# Patient Record
Sex: FEMALE | Race: BLACK OR AFRICAN AMERICAN | NOT HISPANIC OR LATINO | Employment: FULL TIME | URBAN - METROPOLITAN AREA
[De-identification: names, ages, dates, MRNs, and addresses within clinical notes are randomized per-mention and may not be internally consistent; named-entity substitution may affect disease eponyms.]

---

## 2018-06-11 ENCOUNTER — OFFICE VISIT (OUTPATIENT)
Dept: INTERNAL MEDICINE | Facility: CLINIC | Age: 43
End: 2018-06-11
Payer: COMMERCIAL

## 2018-06-11 VITALS
HEART RATE: 80 BPM | DIASTOLIC BLOOD PRESSURE: 78 MMHG | WEIGHT: 246 LBS | TEMPERATURE: 97 F | BODY MASS INDEX: 46.44 KG/M2 | SYSTOLIC BLOOD PRESSURE: 142 MMHG | HEIGHT: 61 IN | OXYGEN SATURATION: 99 %

## 2018-06-11 DIAGNOSIS — R23.3 EASY BRUISING: ICD-10-CM

## 2018-06-11 DIAGNOSIS — Z80.0 FAMILY HISTORY OF COLON CANCER IN MOTHER: ICD-10-CM

## 2018-06-11 DIAGNOSIS — R03.0 ELEVATED BLOOD PRESSURE READING IN OFFICE WITHOUT DIAGNOSIS OF HYPERTENSION: ICD-10-CM

## 2018-06-11 DIAGNOSIS — Z00.01 ENCOUNTER FOR PREVENTATIVE ADULT HEALTH CARE EXAM WITH ABNORMAL FINDINGS: ICD-10-CM

## 2018-06-11 DIAGNOSIS — R51.9 ACUTE NONINTRACTABLE HEADACHE, UNSPECIFIED HEADACHE TYPE: Primary | ICD-10-CM

## 2018-06-11 PROCEDURE — 99202 OFFICE O/P NEW SF 15 MIN: CPT | Mod: ,,, | Performed by: INTERNAL MEDICINE

## 2018-06-11 RX ORDER — IBUPROFEN 200 MG
400 TABLET ORAL EVERY 6 HOURS PRN
COMMUNITY
End: 2019-05-13

## 2018-06-11 RX ORDER — ACETAMINOPHEN 325 MG/1
650 TABLET ORAL EVERY 6 HOURS PRN
COMMUNITY
End: 2018-07-09

## 2018-06-11 NOTE — PROGRESS NOTES
Subjective:       Patient ID: Noris Lo is a 42 y.o. female.    Chief Complaint: Establish Care (new patient establishment); Headache; and Arm Pain (occasional pain in left arm)    Here to establish care with PCP; just recently obtained insurance.  Has been going to [facility] clinic in [place] as needed but hasn't been there in several years.        Headache    This is a new problem. The current episode started 1 to 4 weeks ago. The problem occurs intermittently. The problem has been unchanged. The pain is located in the bilateral (but usually occurs more often on left side) region. The pain radiates to the left shoulder. The quality of the pain is described as pulsating and throbbing. The pain is at a severity of 8/10. Associated symptoms include back pain, blurred vision (sometimes but not usually associated with headaches), coughing, rhinorrhea and scalp tenderness. Pertinent negatives include no abdominal pain, dizziness, fever, hearing loss, nausea, neck pain, numbness, photophobia, seizures, sinus pressure, vomiting or weakness. The symptoms are aggravated by bright light and work. She has tried NSAIDs and acetaminophen (usually wears hair pulled back tight and finds that is she takes it down, it seems to help;  OTC meds usually help also) for the symptoms. (No known h/o HTN but has checked BP at work when she has a headache and it has been elevated; 150s/120s)     Review of Systems   Constitutional: Positive for chills. Negative for fatigue, fever and unexpected weight change.   HENT: Positive for rhinorrhea. Negative for congestion, hearing loss, postnasal drip, sinus pressure, trouble swallowing and voice change.    Eyes: Positive for blurred vision (sometimes but not usually associated with headaches) and visual disturbance. Negative for photophobia.   Respiratory: Positive for cough. Negative for apnea, choking, chest tightness, shortness of breath and wheezing.    Cardiovascular: Negative for chest  pain, palpitations and leg swelling.   Gastrointestinal: Negative for abdominal pain, blood in stool, constipation, diarrhea, nausea, rectal pain and vomiting.   Endocrine: Negative for cold intolerance, heat intolerance, polydipsia and polyuria.   Genitourinary: Negative for decreased urine volume, difficulty urinating, dysuria, frequency, genital sores, hematuria, menstrual problem, pelvic pain, urgency and vaginal bleeding. Vaginal discharge: occasional.   Musculoskeletal: Positive for back pain. Negative for arthralgias, gait problem, joint swelling, myalgias, neck pain and neck stiffness.   Skin: Negative for color change, rash and wound.   Allergic/Immunologic: Negative for environmental allergies and food allergies.   Neurological: Positive for headaches. Negative for dizziness, tremors, seizures, syncope, facial asymmetry, speech difficulty, weakness, light-headedness and numbness.   Hematological: Negative for adenopathy. Bruises/bleeds easily.   Psychiatric/Behavioral: Negative for confusion, hallucinations, sleep disturbance and suicidal ideas. The patient is nervous/anxious.      History reviewed. No pertinent past medical history.   Past Surgical History:   Procedure Laterality Date    HERNIA REPAIR      umbilical x2       Family History   Problem Relation Age of Onset    Colon cancer Mother 48    Cirrhosis Father     Liver cancer Father        Social History     Social History    Marital status:      Spouse name: N/A    Number of children: N/A    Years of education: N/A     Occupational History    hoursekeeping      Social History Main Topics    Smoking status: Never Smoker    Smokeless tobacco: Never Used    Alcohol use Yes      Comment: occasional    Drug use: No    Sexual activity: Yes     Partners: Male     Birth control/ protection: None      Comment:      Other Topics Concern    None     Social History Narrative    Live with        Current Outpatient  "Prescriptions   Medication Sig Dispense Refill    acetaminophen (TYLENOL) 325 MG tablet Take 650 mg by mouth every 6 (six) hours as needed for Pain.      ibuprofen (ADVIL,MOTRIN) 200 MG tablet Take 400 mg by mouth every 6 (six) hours as needed for Pain.       No current facility-administered medications for this visit.        Review of patient's allergies indicates:  No Known Allergies  Objective:    HPI     Establish Care    Additional comments: new patient establishment           Arm Pain    Additional comments: occasional pain in left arm       Last edited by Madelin Adams MA on 6/11/2018  2:25 PM. (History)      Blood pressure (!) 142/78, pulse 80, temperature 96.7 °F (35.9 °C), temperature source Temporal, height 5' 1.25" (1.556 m), weight 111.6 kg (246 lb), last menstrual period 06/07/2018, SpO2 99 %. Body mass index is 46.1 kg/m².   Physical Exam   Constitutional: She appears well-developed. No distress.   Morbidly obese     HENT:   Head: Normocephalic and atraumatic.   Right Ear: Hearing, tympanic membrane, external ear and ear canal normal.   Left Ear: Hearing, tympanic membrane, external ear and ear canal normal.   Nose: Nose normal.   Mouth/Throat: Uvula is midline and oropharynx is clear and moist.   Eyes: Conjunctivae, EOM and lids are normal. Pupils are equal, round, and reactive to light. Right eye exhibits no discharge. Left eye exhibits no discharge. Right conjunctiva is not injected. Right conjunctiva has no hemorrhage. Left conjunctiva is not injected. Left conjunctiva has no hemorrhage. No scleral icterus.   Neck: Carotid bruit is not present. No thyromegaly present.   Cardiovascular: Normal rate, regular rhythm and normal heart sounds.  Exam reveals no gallop and no friction rub.    No murmur heard.  Pulses:       Dorsalis pedis pulses are 2+ on the right side, and 2+ on the left side.   Pulmonary/Chest: Effort normal and breath sounds normal. No respiratory distress. She has no decreased " breath sounds. She has no wheezes. She has no rhonchi. She has no rales.   Abdominal: Soft. Bowel sounds are normal. She exhibits no distension, no abdominal bruit, no pulsatile midline mass and no mass. There is no hepatosplenomegaly. There is no tenderness. There is no rebound and no guarding. No hernia.   Musculoskeletal: She exhibits no edema.   Lymphadenopathy:     She has no cervical adenopathy.   Neurological: She is alert. She has normal reflexes. She displays no tremor. No cranial nerve deficit.   Skin: Skin is warm and dry.   Psychiatric: She has a normal mood and affect. Her speech is normal and behavior is normal.   Nursing note and vitals reviewed.          Assessment:       1. Acute nonintractable headache, unspecified headache type    2. Elevated blood pressure reading in office without diagnosis of hypertension    3. Family history of colon cancer in mother    4. Class 3 obesity with body mass index (BMI) of 45.0 to 49.9 in adult, unspecified obesity type, unspecified whether serious comorbidity present    5. Encounter for preventative adult health care exam with abnormal findings    6. Easy bruising        Plan:       Noris was seen today for establish care, headache and arm pain.    Diagnoses and all orders for this visit:    Acute nonintractable headache, unspecified headache type  Comments:  I suspect it is r/t pulling hair too tightly but allergies or BP also considerations.  Try leaving hair down for a week and see what happens      Elevated blood pressure reading in office without diagnosis of hypertension  Comments:  Low salt diet.  Discussed proper cuff size      Family history of colon cancer in mother  -     Ambulatory referral to Gastroenterology    Class 3 obesity with body mass index (BMI) of 45.0 to 49.9 in adult, unspecified obesity type, unspecified whether serious comorbidity present  Comments:  Discussed diet and exercise    Orders:  -     TSH; Future  -     TSH    Encounter  for preventative adult health care exam with abnormal findings  Comments:  Will order labs now; PAP and breast exam next visit  Orders:  -     Comprehensive metabolic panel; Future  -     Lipid panel; Future  -     Comprehensive metabolic panel  -     Lipid panel    Easy bruising  -     CBC auto differential; Future  -     CBC auto differential

## 2018-06-15 ENCOUNTER — TELEPHONE (OUTPATIENT)
Dept: INTERNAL MEDICINE | Facility: CLINIC | Age: 43
End: 2018-06-15

## 2018-06-15 LAB
ALBUMIN SERPL-MCNC: 3.9 G/DL (ref 3.5–5.5)
ALBUMIN/GLOB SERPL: 1.4 {RATIO} (ref 1.2–2.2)
ALP SERPL-CCNC: 53 IU/L (ref 39–117)
ALT SERPL-CCNC: 12 IU/L (ref 0–32)
AST SERPL-CCNC: 19 IU/L (ref 0–40)
BASOPHILS # BLD AUTO: 0 X10E3/UL (ref 0–0.2)
BASOPHILS NFR BLD AUTO: 0 %
BILIRUB SERPL-MCNC: 0.2 MG/DL (ref 0–1.2)
BUN SERPL-MCNC: 12 MG/DL (ref 6–24)
BUN/CREAT SERPL: 17 (ref 9–23)
CALCIUM SERPL-MCNC: 9.3 MG/DL (ref 8.7–10.2)
CHLORIDE SERPL-SCNC: 102 MMOL/L (ref 96–106)
CHOLEST SERPL-MCNC: 175 MG/DL (ref 100–199)
CO2 SERPL-SCNC: 21 MMOL/L (ref 20–29)
CREAT SERPL-MCNC: 0.72 MG/DL (ref 0.57–1)
EOSINOPHIL # BLD AUTO: 0.1 X10E3/UL (ref 0–0.4)
EOSINOPHIL NFR BLD AUTO: 2 %
ERYTHROCYTE [DISTWIDTH] IN BLOOD BY AUTOMATED COUNT: 15 % (ref 12.3–15.4)
GFR SERPLBLD CREATININE-BSD FMLA CKD-EPI: 104 ML/MIN/1.73
GFR SERPLBLD CREATININE-BSD FMLA CKD-EPI: 119 ML/MIN/1.73
GLOBULIN SER CALC-MCNC: 2.8 G/DL (ref 1.5–4.5)
GLUCOSE SERPL-MCNC: 95 MG/DL (ref 65–99)
HCT VFR BLD AUTO: 35.7 % (ref 34–46.6)
HDLC SERPL-MCNC: 55 MG/DL
HGB BLD-MCNC: 11.2 G/DL (ref 11.1–15.9)
IMM GRANULOCYTES # BLD: 0 X10E3/UL (ref 0–0.1)
IMM GRANULOCYTES NFR BLD: 0 %
LDLC SERPL CALC-MCNC: 107 MG/DL (ref 0–99)
LYMPHOCYTES # BLD AUTO: 2.3 X10E3/UL (ref 0.7–3.1)
LYMPHOCYTES NFR BLD AUTO: 30 %
MCH RBC QN AUTO: 28 PG (ref 26.6–33)
MCHC RBC AUTO-ENTMCNC: 31.4 G/DL (ref 31.5–35.7)
MCV RBC AUTO: 89 FL (ref 79–97)
MONOCYTES # BLD AUTO: 0.5 X10E3/UL (ref 0.1–0.9)
MONOCYTES NFR BLD AUTO: 7 %
NEUTROPHILS # BLD AUTO: 4.5 X10E3/UL (ref 1.4–7)
NEUTROPHILS NFR BLD AUTO: 61 %
PLATELET # BLD AUTO: 291 X10E3/UL (ref 150–379)
POTASSIUM SERPL-SCNC: 4.3 MMOL/L (ref 3.5–5.2)
PROT SERPL-MCNC: 6.7 G/DL (ref 6–8.5)
RBC # BLD AUTO: 4 X10E6/UL (ref 3.77–5.28)
SODIUM SERPL-SCNC: 139 MMOL/L (ref 134–144)
TRIGL SERPL-MCNC: 63 MG/DL (ref 0–149)
TSH SERPL DL<=0.005 MIU/L-ACNC: 2.4 UIU/ML (ref 0.45–4.5)
VLDLC SERPL CALC-MCNC: 13 MG/DL (ref 5–40)
WBC # BLD AUTO: 7.5 X10E3/UL (ref 3.4–10.8)

## 2018-06-15 NOTE — TELEPHONE ENCOUNTER
----- Message from Ishmael Gil Jr., MD sent at 6/15/2018  8:29 AM CDT -----  I have reviewed your results.  They demonstrate no abnormal findings.  If you have any additional concerns regarding these tests, please contact me at your convenience.

## 2018-07-09 ENCOUNTER — OFFICE VISIT (OUTPATIENT)
Dept: INTERNAL MEDICINE | Facility: CLINIC | Age: 43
End: 2018-07-09
Payer: COMMERCIAL

## 2018-07-09 VITALS
TEMPERATURE: 98 F | SYSTOLIC BLOOD PRESSURE: 124 MMHG | HEIGHT: 61 IN | BODY MASS INDEX: 46.03 KG/M2 | OXYGEN SATURATION: 98 % | DIASTOLIC BLOOD PRESSURE: 76 MMHG | WEIGHT: 243.81 LBS | HEART RATE: 97 BPM

## 2018-07-09 DIAGNOSIS — Z01.419 WELL WOMAN EXAM WITH ROUTINE GYNECOLOGICAL EXAM: Primary | ICD-10-CM

## 2018-07-09 DIAGNOSIS — Z12.39 ENCOUNTER FOR SCREENING BREAST EXAMINATION: ICD-10-CM

## 2018-07-09 DIAGNOSIS — R03.0 ELEVATED BLOOD PRESSURE READING IN OFFICE WITHOUT DIAGNOSIS OF HYPERTENSION: ICD-10-CM

## 2018-07-09 PROCEDURE — 99396 PREV VISIT EST AGE 40-64: CPT | Mod: ,,, | Performed by: INTERNAL MEDICINE

## 2018-07-09 NOTE — PROGRESS NOTES
Subjective:       Patient ID: Noris Lo is a 42 y.o. female.    Chief Complaint: Follow-up (followup labs)    Here for follow up and annual well visit.  She hasn't had a headache since she started leaving her hair down.  Also has been monitoring BP and it has been running 120s/70s at home.        Review of Systems   Constitutional: Positive for fatigue. Negative for chills, fever and unexpected weight change.   HENT: Negative for congestion, hearing loss, postnasal drip, rhinorrhea, trouble swallowing and voice change.    Eyes: Negative for photophobia and visual disturbance.   Respiratory: Negative for apnea, cough, choking, chest tightness, shortness of breath and wheezing.    Cardiovascular: Negative for chest pain, palpitations and leg swelling.   Gastrointestinal: Negative for abdominal pain, blood in stool, constipation, diarrhea, nausea, rectal pain and vomiting.   Endocrine: Negative for cold intolerance, heat intolerance, polydipsia and polyuria.   Genitourinary: Negative for decreased urine volume, difficulty urinating, dysuria, frequency, genital sores, hematuria, menstrual problem, pelvic pain, urgency, vaginal bleeding and vaginal discharge.   Musculoskeletal: Positive for arthralgias (left shoulder). Negative for back pain, gait problem, joint swelling, myalgias, neck pain and neck stiffness.   Skin: Negative for color change, rash and wound.   Allergic/Immunologic: Negative for environmental allergies and food allergies.   Neurological: Negative for dizziness, tremors, seizures, syncope, facial asymmetry, speech difficulty, weakness, light-headedness, numbness and headaches.   Hematological: Negative for adenopathy. Does not bruise/bleed easily.   Psychiatric/Behavioral: Negative for confusion, hallucinations, sleep disturbance and suicidal ideas. The patient is not nervous/anxious.      History reviewed. No pertinent past medical history.   Past Surgical History:   Procedure Laterality Date  "   HERNIA REPAIR      umbilical x2       Family History   Problem Relation Age of Onset    Colon cancer Mother 48    Cirrhosis Father     Liver cancer Father        Social History     Social History    Marital status:      Spouse name: N/A    Number of children: N/A    Years of education: N/A     Occupational History    hoursekeeping      Social History Main Topics    Smoking status: Never Smoker    Smokeless tobacco: Never Used    Alcohol use Yes      Comment: occasional    Drug use: No    Sexual activity: Yes     Partners: Male     Birth control/ protection: None      Comment:      Other Topics Concern    None     Social History Narrative    Live with        Current Outpatient Prescriptions   Medication Sig Dispense Refill    ibuprofen (ADVIL,MOTRIN) 200 MG tablet Take 400 mg by mouth every 6 (six) hours as needed for Pain.       No current facility-administered medications for this visit.        Review of patient's allergies indicates:  No Known Allergies  Objective:    HPI     Follow-up    Additional comments: followup labs       Last edited by Madelin Adams MA on 7/9/2018  4:41 PM. (History)      Blood pressure 124/76, pulse 97, temperature 97.6 °F (36.4 °C), temperature source Temporal, height 5' 1.25" (1.556 m), weight 110.6 kg (243 lb 12.8 oz), last menstrual period 06/30/2018, SpO2 98 %. Body mass index is 45.69 kg/m².   Physical Exam   Constitutional: She appears well-developed. No distress.   Morbidly obese     HENT:   Head: Normocephalic and atraumatic.   Right Ear: Hearing, tympanic membrane, external ear and ear canal normal.   Left Ear: Hearing, tympanic membrane, external ear and ear canal normal.   Nose: Nose normal.   Mouth/Throat: Uvula is midline and oropharynx is clear and moist.   Eyes: Conjunctivae, EOM and lids are normal. Pupils are equal, round, and reactive to light. Right eye exhibits no discharge. Left eye exhibits no discharge. Right conjunctiva is " not injected. Right conjunctiva has no hemorrhage. Left conjunctiva is not injected. Left conjunctiva has no hemorrhage. No scleral icterus.   Neck: Carotid bruit is not present. No thyromegaly present.   Cardiovascular: Normal rate, regular rhythm and normal heart sounds.  Exam reveals no gallop and no friction rub.    No murmur heard.  Pulses:       Dorsalis pedis pulses are 2+ on the right side, and 2+ on the left side.   Pulmonary/Chest: Effort normal and breath sounds normal. No respiratory distress. She has no decreased breath sounds. She has no wheezes. She has no rhonchi. She has no rales. Right breast exhibits no inverted nipple, no mass, no nipple discharge, no skin change and no tenderness. Left breast exhibits no inverted nipple, no mass, no nipple discharge, no skin change and no tenderness. Breasts are symmetrical (pendulous).   Abdominal: Soft. Bowel sounds are normal. She exhibits no distension, no abdominal bruit, no pulsatile midline mass and no mass. There is no hepatosplenomegaly. There is no tenderness. There is no rebound and no guarding. No hernia.   Genitourinary: Vagina normal. Pelvic exam was performed with patient supine. No labial fusion. There is no rash, tenderness, lesion or injury on the right labia. There is no rash, tenderness, lesion or injury on the left labia. Uterus is not tender. Cervix exhibits no motion tenderness, no discharge and no friability. Right adnexum displays no mass, no tenderness and no fullness. Left adnexum displays no mass, no tenderness and no fullness.   Genitourinary Comments: cystocoele     Musculoskeletal: She exhibits no edema.   Lymphadenopathy:     She has no cervical adenopathy.   Neurological: She is alert. She has normal reflexes. She displays no tremor. No cranial nerve deficit.   Skin: Skin is warm and dry.   Psychiatric: She has a normal mood and affect. Her speech is normal and behavior is normal.   Nursing note and vitals reviewed.      Office  Visit on 06/11/2018   Component Date Value Ref Range Status    Glucose 06/14/2018 95  65 - 99 mg/dL Final    BUN, Bld 06/14/2018 12  6 - 24 mg/dL Final    Creatinine 06/14/2018 0.72  0.57 - 1.00 mg/dL Final    eGFR if non African American 06/14/2018 104  >59 mL/min/1.73 Final    eGFR if  06/14/2018 119  >59 mL/min/1.73 Final    BUN/Creatinine Ratio 06/14/2018 17  9 - 23 Final    Sodium 06/14/2018 139  134 - 144 mmol/L Final    Potassium 06/14/2018 4.3  3.5 - 5.2 mmol/L Final    Chloride 06/14/2018 102  96 - 106 mmol/L Final    CO2 06/14/2018 21  20 - 29 mmol/L Final                  **Please note reference interval change**    Calcium 06/14/2018 9.3  8.7 - 10.2 mg/dL Final    Total Protein 06/14/2018 6.7  6.0 - 8.5 g/dL Final    Albumin 06/14/2018 3.9  3.5 - 5.5 g/dL Final    Globulin, Total 06/14/2018 2.8  1.5 - 4.5 g/dL Final    Albumin/Globulin Ratio 06/14/2018 1.4  1.2 - 2.2 Final    Total Bilirubin 06/14/2018 0.2  0.0 - 1.2 mg/dL Final    Alkaline Phosphatase 06/14/2018 53  39 - 117 IU/L Final    AST 06/14/2018 19  0 - 40 IU/L Final    ALT 06/14/2018 12  0 - 32 IU/L Final    Cholesterol 06/14/2018 175  100 - 199 mg/dL Final    Triglycerides 06/14/2018 63  0 - 149 mg/dL Final    HDL 06/14/2018 55  >39 mg/dL Final    VLDL Cholesterol Lauro 06/14/2018 13  5 - 40 mg/dL Final    LDL Calculated 06/14/2018 107* 0 - 99 mg/dL Final    TSH 06/14/2018 2.400  0.450 - 4.500 uIU/mL Final    WBC 06/14/2018 7.5  3.4 - 10.8 x10E3/uL Final    RBC 06/14/2018 4.00  3.77 - 5.28 x10E6/uL Final    Hemoglobin 06/14/2018 11.2  11.1 - 15.9 g/dL Final    Hematocrit 06/14/2018 35.7  34.0 - 46.6 % Final    MCV 06/14/2018 89  79 - 97 fL Final    MCH 06/14/2018 28.0  26.6 - 33.0 pg Final    MCHC 06/14/2018 31.4* 31.5 - 35.7 g/dL Final    RDW 06/14/2018 15.0  12.3 - 15.4 % Final    Platelets 06/14/2018 291  150 - 379 x10E3/uL Final    Neutrophils 06/14/2018 61  Not Estab. % Final    Lymph%  06/14/2018 30  Not Estab. % Final    Mono% 06/14/2018 7  Not Estab. % Final    Eosinophil% 06/14/2018 2  Not Estab. % Final    Basophil% 06/14/2018 0  Not Estab. % Final    Neutrophils Absolute 06/14/2018 4.5  1.4 - 7.0 x10E3/uL Final    Lymph # 06/14/2018 2.3  0.7 - 3.1 x10E3/uL Final    Mono # 06/14/2018 0.5  0.1 - 0.9 x10E3/uL Final    Eos # 06/14/2018 0.1  0.0 - 0.4 x10E3/uL Final    Baso # 06/14/2018 0.0  0.0 - 0.2 x10E3/uL Final    Immature Granulocytes 06/14/2018 0  Not Estab. % Final    Immature Grans (Abs) 06/14/2018 0.0  0.0 - 0.1 x10E3/uL Final   ]  Assessment:       1. Well woman exam with routine gynecological exam    2. Encounter for screening breast examination    3. Elevated blood pressure reading in office without diagnosis of hypertension        Plan:       Noris was seen today for follow-up.    Diagnoses and all orders for this visit:    Well woman exam with routine gynecological exam  Comments:  Discussed kegal exercises.    Encounter for screening breast examination    Elevated blood pressure reading in office without diagnosis of hypertension  Comments:  BP back to nornal.  It was likely elevated d/t headache.  Continue to monitor and work on weight loss.

## 2018-07-18 LAB
CYTOLOGIST CVX/VAG CYTO: NORMAL
CYTOLOGY CVX/VAG DOC THIN PREP: NORMAL
DX ICD CODE: NORMAL
HPV I/H RISK 1 DNA CVX QL PROBE+SIG AMP: NEGATIVE
Lab: NORMAL
OTHER STN SPEC: NORMAL
PATH REPORT.FINAL DX SPEC: NORMAL
STAT OF ADQ CVX/VAG CYTO-IMP: NORMAL

## 2018-12-10 ENCOUNTER — OFFICE VISIT (OUTPATIENT)
Dept: FAMILY MEDICINE | Facility: CLINIC | Age: 43
End: 2018-12-10
Payer: COMMERCIAL

## 2018-12-10 VITALS
TEMPERATURE: 97 F | DIASTOLIC BLOOD PRESSURE: 76 MMHG | HEIGHT: 61 IN | HEART RATE: 101 BPM | SYSTOLIC BLOOD PRESSURE: 130 MMHG | BODY MASS INDEX: 46.44 KG/M2 | WEIGHT: 246 LBS | OXYGEN SATURATION: 97 %

## 2018-12-10 DIAGNOSIS — R03.0 ELEVATED BLOOD PRESSURE READING IN OFFICE WITHOUT DIAGNOSIS OF HYPERTENSION: Primary | ICD-10-CM

## 2018-12-10 PROCEDURE — 99213 OFFICE O/P EST LOW 20 MIN: CPT | Mod: ,,, | Performed by: INTERNAL MEDICINE

## 2018-12-10 NOTE — PATIENT INSTRUCTIONS
Your overall health would benefit from weight loss.  There are many different systems out there to achieve weight loss but they are often hard to stick with over long periods of time.  I try to keep it simple.  Try and do some form of exercise, even if it is just walking, 30 minutes per day.  Try to limit your sugar intake by cutting back on soft drinks, sweet tea, desserts, snacks, etc.  Drink more water, especially while preparing a meal and with a meal.  This occupies space in your stomach and makes you feel full faster so you eat less.  Put less food on the plate; most of us eat double or triple the recommended serving sizes.  Even if you are eating the right things, you could still be eating too much.  Don't go back for seconds immediately.  There is a delay between your stomach and brain of about 20-30 minutes.  You may still feel hungry even though you really have had enough to eat.  Let your brain catch up to your stomach.  At the end of the day, it doesn't matter which system you use, it's about calories in and calories out.    Low-Salt Diet  This diet removes foods that are high in salt. It also limits the amount of salt you use when cooking. It is most often used for people with high blood pressure, edema (fluid retention), and kidney, liver, or heart disease.  Table salt contains the mineral sodium. Your body needs sodium to work normally. But too much sodium can make your health problems worse. Your healthcare provider is recommending a low-salt (also called low-sodium) diet for you. Your total daily allowance of salt is 1,500 to 2,300 milligrams (mg). It is less than 1 teaspoon of table salt. This means you can have only about 500 to 700 mg of sodium at each meal. People with certain health problems should limit salt intake to the lower end of the recommended range.    When you cook, dont add much salt. If you can cook without using salt, even better. Dont add salt to your food at the table.  When  shopping, read food labels. Salt is often called sodium on the label. Choose foods that are salt-free, low salt, or very low salt. Note that foods with reduced salt may not lower your salt intake enough.    Beans, potatoes, and pasta  Ok: Dry beans, split peas, lentils, potatoes, rice, macaroni, pasta, spaghetti without added salt  Avoid: Potato chips, tortilla chips, and similar products  Breads and cereals  Ok: Low-sodium breads, rolls, cereals, and cakes; low-salt crackers, matzo crackers  Avoid: Salted crackers, pretzels, popcorn, Lithuanian toast, pancakes, muffins  Dairy  Ok: Milk, chocolate milk, hot chocolate mix, low-salt cheeses, and yogurt  Avoid: Processed cheese and cheese spreads; Roquefort, Camembert, and cottage cheese; buttermilk, instant breakfast drink  Desserts  Ok: Ice cream, frozen yogurt, juice bars, gelatin, cookies and pies, sugar, honey, jelly, hard candy  Avoid: Most pies, cakes and cookies prepared or processed with salt; instant pudding  Drinks  Ok: Tea, coffee, fizzy (carbonated) drinks, juices  Avoid: Flavored coffees, electrolyte replacement drinks, sports drinks  Meats  Ok: All fresh meat, fish, poultry, low-salt tuna, eggs, egg substitute  Avoid: Smoked, pickled, brine-cured, or salted meats and fish. This includes hester, chipped beef, corned beef, hot dogs, deli meats, ham, kosher meats, salt pork, sausage, canned tuna, salted codfish, smoked salmon, herring, sardines, or anchovies.  Seasonings and spices  Ok: Most seasonings are okay. Good substitutes for salt include: fresh herb blends, hot sauce, lemon, garlic, mora, vinegar, dry mustard, parsley, cilantro, horseradish, tomato paste, regular margarine, mayonnaise, unsalted butter, cream cheese, vegetable oil, cream, low-salt salad dressing and gravy.  Avoid: Regular ketchup, relishes, pickles, soy sauce, teriyaki sauce, Worcestershire sauce, BBQ sauce, tartar sauce, meat tenderizer, chili sauce, regular gravy, regular salad  dressing, salted butter  Soups  Ok: Low-salt soups and broths made with allowed foods  Avoid: Bouillon cubes, soups with smoked or salted meats, regular soup and broth  Vegetables  Ok: Most vegetables are okay; also low-salt tomato and vegetable juices  Avoid: Sauerkraut and other brine-soaked vegetables; pickles and other pickled vegetables; tomato juice, olives  Date Last Reviewed: 8/1/2016 © 2000-2017 [employer], [employer]. [address] [place] [address]. All rights reserved. This information is not intended as a substitute for professional medical care. Always follow your healthcare professional's instructions.

## 2018-12-10 NOTE — PROGRESS NOTES
Subjective:       Patient ID: Noris Lo is a 42 y.o. female.    Chief Complaint: Follow-up (general checkup)    Here for routine follow up to monitor BP which was elevated at a prior visit.  Has continued to monitor it at home and it's been mostly 130s/80s; sometimes higher when stressed.  No further issues with headaches since we discussed not putting hair in tight alisha.        Review of Systems   Constitutional: Positive for fatigue. Negative for chills, fever and unexpected weight change.   HENT: Negative for congestion, hearing loss, postnasal drip, rhinorrhea, trouble swallowing and voice change.    Eyes: Negative for photophobia and visual disturbance.   Respiratory: Negative for apnea, cough, choking, chest tightness, shortness of breath and wheezing.    Cardiovascular: Negative for chest pain, palpitations and leg swelling.   Gastrointestinal: Negative for abdominal pain, blood in stool, constipation, diarrhea, nausea, rectal pain and vomiting.   Endocrine: Negative for cold intolerance, heat intolerance, polydipsia and polyuria.   Genitourinary: Negative for decreased urine volume, difficulty urinating, dysuria, frequency, genital sores, hematuria, menstrual problem, pelvic pain, urgency, vaginal bleeding and vaginal discharge.   Musculoskeletal: Positive for arthralgias (left shoulder). Negative for back pain, gait problem, joint swelling, myalgias, neck pain and neck stiffness.   Skin: Negative for color change, rash and wound.   Allergic/Immunologic: Negative for environmental allergies and food allergies.   Neurological: Negative for dizziness, tremors, seizures, syncope, facial asymmetry, speech difficulty, weakness, light-headedness, numbness and headaches.   Hematological: Negative for adenopathy. Does not bruise/bleed easily.   Psychiatric/Behavioral: Negative for confusion, hallucinations, sleep disturbance and suicidal ideas. The patient is not nervous/anxious.      History reviewed.  "No pertinent past medical history.   Past Surgical History:   Procedure Laterality Date    HERNIA REPAIR      umbilical x2       Family History   Problem Relation Age of Onset    Colon cancer Mother 48    Cirrhosis Father     Liver cancer Father        Social History     Socioeconomic History    Marital status:      Spouse name: None    Number of children: None    Years of education: None    Highest education level: None   Social Needs    Financial resource strain: None    Food insecurity - worry: None    Food insecurity - inability: None    Transportation needs - medical: None    Transportation needs - non-medical: None   Occupational History    Occupation: hoursekeeping   Tobacco Use    Smoking status: Never Smoker    Smokeless tobacco: Never Used   Substance and Sexual Activity    Alcohol use: Yes     Comment: occasional    Drug use: No    Sexual activity: Yes     Partners: Male     Birth control/protection: None     Comment:    Other Topics Concern    None   Social History Narrative    Live with        Current Outpatient Medications   Medication Sig Dispense Refill    ibuprofen (ADVIL,MOTRIN) 200 MG tablet Take 400 mg by mouth every 6 (six) hours as needed for Pain.       No current facility-administered medications for this visit.        Review of patient's allergies indicates:  No Known Allergies  Objective:    HPI     Follow-up      Additional comments: general checkup          Last edited by Madelin Adams MA on 12/10/2018  3:56 PM. (History)      Blood pressure 130/76, pulse 101, temperature 97.3 °F (36.3 °C), temperature source Temporal, height 5' 1.25" (1.556 m), weight 111.6 kg (246 lb), last menstrual period 11/12/2018, SpO2 97 %. Body mass index is 46.1 kg/m².   Physical Exam   Constitutional: She appears well-developed. No distress.   Obese     HENT:   Nose: Nose normal.   Mouth/Throat: Oropharynx is clear and moist.   Eyes: Conjunctivae are normal. Right eye " exhibits no discharge. Left eye exhibits no discharge. No scleral icterus.   Neck: Carotid bruit is not present.   Cardiovascular: Normal rate, regular rhythm and normal heart sounds.   No murmur heard.  Pulmonary/Chest: Effort normal and breath sounds normal. No respiratory distress. She has no decreased breath sounds. She has no wheezes. She has no rhonchi. She has no rales.   Abdominal: Soft. She exhibits no distension. There is no tenderness. There is no rebound and no guarding.   Musculoskeletal: She exhibits no edema.   Neurological: She is alert. She displays no tremor.   Skin: Skin is warm and dry.   Psychiatric: She has a normal mood and affect. Her speech is normal.   Nursing note and vitals reviewed.        Orders Only on 07/09/2018   Component Date Value Ref Range Status    DIAGNOSIS: 07/09/2018 Comment   Final    NEGATIVE FOR INTRAEPITHELIAL LESION AND MALIGNANCY.    Specimen adequacy: 07/09/2018 Comment   Final    Satisfactory for evaluation. No endocervical component is identified.    Clinician Provided ICD10 07/09/2018 Comment   Final    [id number]    Performed by: 07/09/2018 Comment   Final    Kate Walters, Cytotechnologist (ASCP)    . 07/09/2018 .   Final    Note: 07/09/2018 Comment   Final    Comment: The Pap smear is a screening test designed to aid in the detection of  premalignant and malignant conditions of the uterine cervix.  It is not a  diagnostic procedure and should not be used as the sole means of detecting  cervical cancer.  Both false-positive and false-negative reports do occur.      Test Methodology: 07/09/2018 Comment   Final    Comment: This liquid based SurePath(R) pap test was screened with the  assistance of an image guided system.      HPV, high-risk 07/09/2018 Negative  Negative Final    Comment: This high-risk HPV test detects thirteen high-risk types  (16/18/31/33/35/39/45/51/52/56/58/59/68) without differentiation.     Office Visit on 06/11/2018   Component Date Value  Ref Range Status    Glucose 06/14/2018 95  65 - 99 mg/dL Final    BUN, Bld 06/14/2018 12  6 - 24 mg/dL Final    Creatinine 06/14/2018 0.72  0.57 - 1.00 mg/dL Final    eGFR if non African American 06/14/2018 104  >59 mL/min/1.73 Final    eGFR if  06/14/2018 119  >59 mL/min/1.73 Final    BUN/Creatinine Ratio 06/14/2018 17  9 - 23 Final    Sodium 06/14/2018 139  134 - 144 mmol/L Final    Potassium 06/14/2018 4.3  3.5 - 5.2 mmol/L Final    Chloride 06/14/2018 102  96 - 106 mmol/L Final    CO2 06/14/2018 21  20 - 29 mmol/L Final                  **Please note reference interval change**    Calcium 06/14/2018 9.3  8.7 - 10.2 mg/dL Final    Total Protein 06/14/2018 6.7  6.0 - 8.5 g/dL Final    Albumin 06/14/2018 3.9  3.5 - 5.5 g/dL Final    Globulin, Total 06/14/2018 2.8  1.5 - 4.5 g/dL Final    Albumin/Globulin Ratio 06/14/2018 1.4  1.2 - 2.2 Final    Total Bilirubin 06/14/2018 0.2  0.0 - 1.2 mg/dL Final    Alkaline Phosphatase 06/14/2018 53  39 - 117 IU/L Final    AST 06/14/2018 19  0 - 40 IU/L Final    ALT 06/14/2018 12  0 - 32 IU/L Final    Cholesterol 06/14/2018 175  100 - 199 mg/dL Final    Triglycerides 06/14/2018 63  0 - 149 mg/dL Final    HDL 06/14/2018 55  >39 mg/dL Final    VLDL Cholesterol Lauro 06/14/2018 13  5 - 40 mg/dL Final    LDL Calculated 06/14/2018 107* 0 - 99 mg/dL Final    TSH 06/14/2018 2.400  0.450 - 4.500 uIU/mL Final    WBC 06/14/2018 7.5  3.4 - 10.8 x10E3/uL Final    RBC 06/14/2018 4.00  3.77 - 5.28 x10E6/uL Final    Hemoglobin 06/14/2018 11.2  11.1 - 15.9 g/dL Final    Hematocrit 06/14/2018 35.7  34.0 - 46.6 % Final    MCV 06/14/2018 89  79 - 97 fL Final    MCH 06/14/2018 28.0  26.6 - 33.0 pg Final    MCHC 06/14/2018 31.4* 31.5 - 35.7 g/dL Final    RDW 06/14/2018 15.0  12.3 - 15.4 % Final    Platelets 06/14/2018 291  150 - 379 x10E3/uL Final    Neutrophils 06/14/2018 61  Not Estab. % Final    Lymph% 06/14/2018 30  Not Estab. % Final    Mono%  06/14/2018 7  Not Estab. % Final    Eosinophil% 06/14/2018 2  Not Estab. % Final    Basophil% 06/14/2018 0  Not Estab. % Final    Neutrophils Absolute 06/14/2018 4.5  1.4 - 7.0 x10E3/uL Final    Lymph # 06/14/2018 2.3  0.7 - 3.1 x10E3/uL Final    Mono # 06/14/2018 0.5  0.1 - 0.9 x10E3/uL Final    Eos # 06/14/2018 0.1  0.0 - 0.4 x10E3/uL Final    Baso # 06/14/2018 0.0  0.0 - 0.2 x10E3/uL Final    Immature Granulocytes 06/14/2018 0  Not Estab. % Final    Immature Grans (Abs) 06/14/2018 0.0  0.0 - 0.1 x10E3/uL Final   ]  Assessment:       1. Elevated blood pressure reading in office without diagnosis of hypertension        Plan:       Noris was seen today for follow-up.    Diagnoses and all orders for this visit:    Elevated blood pressure reading in office without diagnosis of hypertension  Comments:  Reinforced diet, exercise, weight loss.  Continue to monitor.

## 2018-12-13 ENCOUNTER — TELEPHONE (OUTPATIENT)
Dept: FAMILY MEDICINE | Facility: CLINIC | Age: 43
End: 2018-12-13

## 2018-12-13 RX ORDER — ONDANSETRON HYDROCHLORIDE 8 MG/1
8 TABLET, FILM COATED ORAL EVERY 8 HOURS PRN
Qty: 15 TABLET | Refills: 1 | Status: SHIPPED | OUTPATIENT
Start: 2018-12-13 | End: 2019-05-13

## 2018-12-13 NOTE — TELEPHONE ENCOUNTER
Will send something for vomiting.  Try and avoid antidiarrheals if possible but she can use immodium if it becomes too much.  Make sure to drink plenty of fluids and stick to a bland diet.  Make appointment or go to urgent care if no improvement in 48 hours or if she has severe abdominal pain or bloody diarrhea.

## 2019-05-13 ENCOUNTER — OFFICE VISIT (OUTPATIENT)
Dept: FAMILY MEDICINE | Facility: CLINIC | Age: 44
End: 2019-05-13
Payer: COMMERCIAL

## 2019-05-13 VITALS
BODY MASS INDEX: 46.07 KG/M2 | HEIGHT: 61 IN | HEART RATE: 95 BPM | OXYGEN SATURATION: 99 % | SYSTOLIC BLOOD PRESSURE: 136 MMHG | TEMPERATURE: 98 F | WEIGHT: 244 LBS | DIASTOLIC BLOOD PRESSURE: 82 MMHG

## 2019-05-13 DIAGNOSIS — N39.3 STRESS INCONTINENCE IN FEMALE: ICD-10-CM

## 2019-05-13 DIAGNOSIS — Z01.419 WELL WOMAN EXAM WITH ROUTINE GYNECOLOGICAL EXAM: Primary | ICD-10-CM

## 2019-05-13 DIAGNOSIS — R03.0 ELEVATED BLOOD PRESSURE READING IN OFFICE WITHOUT DIAGNOSIS OF HYPERTENSION: ICD-10-CM

## 2019-05-13 DIAGNOSIS — Z80.0 FAMILY HISTORY OF COLON CANCER IN MOTHER: ICD-10-CM

## 2019-05-13 DIAGNOSIS — E66.01 CLASS 3 SEVERE OBESITY WITH BODY MASS INDEX (BMI) OF 45.0 TO 49.9 IN ADULT, UNSPECIFIED OBESITY TYPE, UNSPECIFIED WHETHER SERIOUS COMORBIDITY PRESENT: ICD-10-CM

## 2019-05-13 DIAGNOSIS — Z12.39 BREAST CANCER SCREENING OTHER THAN MAMMOGRAM: ICD-10-CM

## 2019-05-13 PROBLEM — E66.813 CLASS 3 SEVERE OBESITY WITH BODY MASS INDEX (BMI) OF 45.0 TO 49.9 IN ADULT: Status: ACTIVE | Noted: 2019-05-13

## 2019-05-13 PROCEDURE — 99396 PREV VISIT EST AGE 40-64: CPT | Mod: ,,, | Performed by: INTERNAL MEDICINE

## 2019-05-13 PROCEDURE — 99396 PR PREVENTIVE VISIT,EST,40-64: ICD-10-PCS | Mod: ,,, | Performed by: INTERNAL MEDICINE

## 2019-05-13 NOTE — PROGRESS NOTES
Subjective:       Patient ID: Noris Lo is a 43 y.o. female.    Chief Complaint: Annual Exam (well visit)    Here for annual well visit and to recheck blood pressure which was elevated at last visit.   Doing SBE; no concerns.  Regular menses.  No vaginal discharge or pain.  Monogamous with  of 19 years.  Using condoms for contraception.      Review of Systems   Constitutional: Negative for chills, fatigue, fever and unexpected weight change.   HENT: Positive for sore throat. Negative for congestion, hearing loss, postnasal drip, rhinorrhea, trouble swallowing and voice change.         Getting over a UTI     Eyes: Negative for photophobia and visual disturbance.   Respiratory: Negative for apnea, cough, choking, chest tightness, shortness of breath and wheezing.    Cardiovascular: Negative for chest pain, palpitations and leg swelling.   Gastrointestinal: Negative for abdominal pain, blood in stool, constipation, diarrhea, nausea, rectal pain and vomiting.   Endocrine: Negative for cold intolerance, heat intolerance, polydipsia and polyuria.   Genitourinary: Negative for decreased urine volume, difficulty urinating, dysuria, frequency, genital sores, hematuria, menstrual problem, pelvic pain, urgency, vaginal bleeding and vaginal discharge.        Stress incontinence; get better when she remembers to do Kegals     Musculoskeletal: Positive for back pain. Negative for arthralgias, gait problem, joint swelling, myalgias, neck pain and neck stiffness.   Skin: Negative for color change, rash and wound.   Allergic/Immunologic: Negative for environmental allergies and food allergies.   Neurological: Negative for dizziness, tremors, seizures, syncope, facial asymmetry, speech difficulty, weakness, light-headedness, numbness and headaches.   Hematological: Negative for adenopathy. Does not bruise/bleed easily.   Psychiatric/Behavioral: Negative for confusion, hallucinations, sleep disturbance and suicidal  ideas. The patient is not nervous/anxious.      History reviewed. No pertinent past medical history.   Past Surgical History:   Procedure Laterality Date    HERNIA REPAIR      umbilical x2       Family History   Problem Relation Age of Onset    Colon cancer Mother 48    Cirrhosis Father     Liver cancer Father        Social History     Socioeconomic History    Marital status:      Spouse name: Not on file    Number of children: Not on file    Years of education: Not on file    Highest education level: Not on file   Occupational History    Occupation: hoursekeeping   Social Needs    Financial resource strain: Not on file    Food insecurity:     Worry: Not on file     Inability: Not on file    Transportation needs:     Medical: Not on file     Non-medical: Not on file   Tobacco Use    Smoking status: Never Smoker    Smokeless tobacco: Never Used   Substance and Sexual Activity    Alcohol use: Yes     Frequency: Monthly or less     Drinks per session: 1 or 2     Comment: occasional    Drug use: No    Sexual activity: Yes     Partners: Male     Birth control/protection: Condom     Comment:    Lifestyle    Physical activity:     Days per week: Not on file     Minutes per session: Not on file    Stress: Not on file   Relationships    Social connections:     Talks on phone: Not on file     Gets together: Not on file     Attends Christianity service: Not on file     Active member of club or organization: Not on file     Attends meetings of clubs or organizations: Not on file     Relationship status: Not on file   Other Topics Concern    Not on file   Social History Narrative    Live with        Current Outpatient Medications   Medication Sig Dispense Refill    phenylephrine/DM/acetaminop/GG (MUCINEX FAST-MAX COLD-FLU-THRT ORAL) Take 1 tablet by mouth 2 (two) times daily as needed.       No current facility-administered medications for this visit.        Review of patient's allergies  "indicates:  No Known Allergies  Objective:    [facility]     Annual Exam      Additional comments: well visit          Last edited by Madelin Adams MA on 5/13/2019  3:55 PM. (History)      Blood pressure 136/82, pulse 95, temperature 97.9 °F (36.6 °C), temperature source Oral, height 5' 1" (1.549 m), weight 110.7 kg (244 lb), last menstrual period 05/09/2019, SpO2 99 %. Body mass index is 46.1 kg/m².   Physical Exam   Constitutional: She appears well-developed. No distress.   Morbidly obese     HENT:   Head: Normocephalic and atraumatic.   Right Ear: Hearing, tympanic membrane, external ear and ear canal normal.   Left Ear: Hearing, tympanic membrane, external ear and ear canal normal.   Nose: Nose normal.   Mouth/Throat: Uvula is midline and oropharynx is clear and moist.   Eyes: Pupils are equal, round, and reactive to light. Conjunctivae, EOM and lids are normal. Right eye exhibits no discharge. Left eye exhibits no discharge. Right conjunctiva is not injected. Right conjunctiva has no hemorrhage. Left conjunctiva is not injected. Left conjunctiva has no hemorrhage. No scleral icterus.   Neck: Carotid bruit is not present. No thyromegaly present.   Cardiovascular: Normal rate, regular rhythm and normal heart sounds. Exam reveals no gallop and no friction rub.   No murmur heard.  Pulses:       Dorsalis pedis pulses are 2+ on the right side, and 2+ on the left side.   Pulmonary/Chest: Effort normal and breath sounds normal. No respiratory distress. She has no decreased breath sounds. She has no wheezes. She has no rhonchi. She has no rales. Right breast exhibits no inverted nipple, no mass, no nipple discharge, no skin change and no tenderness. Left breast exhibits no inverted nipple, no mass, no nipple discharge, no skin change and no tenderness. Breasts are symmetrical (pendulous).   Abdominal: Soft. Bowel sounds are normal. She exhibits no distension, no abdominal bruit, no pulsatile midline mass and no mass. There " is no hepatosplenomegaly. There is no tenderness. There is no rebound and no guarding. No hernia.   Genitourinary: Rectal exam shows no external hemorrhoid. Pelvic exam was performed with patient supine. No labial fusion. There is no rash, tenderness, lesion or injury on the right labia. There is no rash, tenderness, lesion or injury on the left labia. Uterus is not tender. Cervix exhibits no motion tenderness, no discharge and no friability. Right adnexum displays no mass, no tenderness and no fullness. Left adnexum displays no mass, no tenderness and no fullness. There is bleeding (tail end of menses) in the vagina.   Genitourinary Comments: +cystocoele    Chaperone present   Musculoskeletal: She exhibits no edema.   Lymphadenopathy:     She has no cervical adenopathy.   Neurological: She is alert. She has normal reflexes. She displays no tremor. No cranial nerve deficit.   Skin: Skin is warm and dry.   Psychiatric: She has a normal mood and affect. Her speech is normal and behavior is normal.   Nursing note and vitals reviewed.          Assessment:       1. Well woman exam with routine gynecological exam    2. Breast cancer screening other than mammogram    3. Family history of colon cancer in mother    4. Stress incontinence in female    5. Elevated blood pressure reading in office without diagnosis of hypertension    6. Class 3 severe obesity with body mass index (BMI) of 45.0 to 49.9 in adult, unspecified obesity type, unspecified whether serious comorbidity present        Plan:       Noris was seen today for annual exam.    Diagnoses and all orders for this visit:    Well woman exam with routine gynecological exam  -     Comprehensive metabolic panel; Future  -     Lipid panel; Future  -     Comprehensive metabolic panel  -     Lipid panel    Breast cancer screening other than mammogram    Family history of colon cancer in mother  -     Ambulatory consult to Gastroenterology    Stress incontinence in  female  Comments:  Discussed Kegal exercises, maryanne wa balls to help stengthen pelvic floor musculature    Elevated blood pressure reading in office without diagnosis of hypertension  Comments:  Still borderline; continue to monitor      Class 3 severe obesity with body mass index (BMI) of 45.0 to 49.9 in adult, unspecified obesity type, unspecified whether serious comorbidity present

## 2019-09-15 ENCOUNTER — HOSPITAL ENCOUNTER (EMERGENCY)
Facility: HOSPITAL | Age: 44
Discharge: HOME | End: 2019-09-15
Attending: EMERGENCY MEDICINE
Payer: COMMERCIAL

## 2019-09-15 VITALS
OXYGEN SATURATION: 98 % | HEIGHT: 63 IN | HEART RATE: 80 BPM | WEIGHT: 232 LBS | BODY MASS INDEX: 41.11 KG/M2 | RESPIRATION RATE: 16 BRPM | SYSTOLIC BLOOD PRESSURE: 130 MMHG | TEMPERATURE: 99 F | DIASTOLIC BLOOD PRESSURE: 70 MMHG

## 2019-09-15 DIAGNOSIS — L02.91 ABSCESS: Primary | ICD-10-CM

## 2019-09-15 DIAGNOSIS — L03.90 CELLULITIS, UNSPECIFIED CELLULITIS SITE: ICD-10-CM

## 2019-09-15 PROCEDURE — 99283 EMERGENCY DEPT VISIT LOW MDM: CPT | Mod: 25

## 2019-09-15 PROCEDURE — 10060 I&D ABSCESS SIMPLE/SINGLE: CPT | Mod: LT

## 2019-09-15 PROCEDURE — 25000003 PHARM REV CODE 250: Performed by: EMERGENCY MEDICINE

## 2019-09-15 RX ORDER — ACETAMINOPHEN 500 MG
1000 TABLET ORAL
Status: COMPLETED | OUTPATIENT
Start: 2019-09-15 | End: 2019-09-15

## 2019-09-15 RX ORDER — MUPIROCIN 20 MG/G
1 OINTMENT TOPICAL
Status: COMPLETED | OUTPATIENT
Start: 2019-09-15 | End: 2019-09-15

## 2019-09-15 RX ORDER — OXYCODONE AND ACETAMINOPHEN 5; 325 MG/1; MG/1
1 TABLET ORAL EVERY 4 HOURS PRN
Qty: 10 TABLET | Refills: 0 | Status: SHIPPED | OUTPATIENT
Start: 2019-09-15 | End: 2019-09-18

## 2019-09-15 RX ORDER — SULFAMETHOXAZOLE AND TRIMETHOPRIM 800; 160 MG/1; MG/1
1 TABLET ORAL 2 TIMES DAILY
Status: DISCONTINUED | OUTPATIENT
Start: 2019-09-15 | End: 2019-09-15 | Stop reason: HOSPADM

## 2019-09-15 RX ORDER — SULFAMETHOXAZOLE AND TRIMETHOPRIM 800; 160 MG/1; MG/1
1 TABLET ORAL 2 TIMES DAILY
Qty: 14 TABLET | Refills: 0 | Status: SHIPPED | OUTPATIENT
Start: 2019-09-15 | End: 2019-09-22

## 2019-09-15 RX ADMIN — ACETAMINOPHEN 1000 MG: 500 TABLET, FILM COATED ORAL at 08:09

## 2019-09-15 RX ADMIN — MUPIROCIN 2 G: 20 OINTMENT TOPICAL at 08:09

## 2019-09-15 RX ADMIN — SULFAMETHOXAZOLE AND TRIMETHOPRIM 1 TABLET: 800; 160 TABLET ORAL at 08:09

## 2019-09-15 NOTE — ED PROVIDER NOTES
Encounter Date: 9/15/2019       History     Chief Complaint   Patient presents with    Insect Bite     left side of head behind ear.      43-year-old female presented emergency department with abscess on the left side of the scalp for the past 3 days with pain and swelling and redness in that area.  Patient denies fever chills nausea vomiting or chest pain or shortness of breath        Review of patient's allergies indicates:  No Known Allergies  No past medical history on file.  Past Surgical History:   Procedure Laterality Date    HERNIA REPAIR      umbilical x2     Family History   Problem Relation Age of Onset    Colon cancer Mother 48    Cirrhosis Father     Liver cancer Father      Social History     Tobacco Use    Smoking status: Never Smoker    Smokeless tobacco: Never Used   Substance Use Topics    Alcohol use: Yes     Frequency: Monthly or less     Drinks per session: 1 or 2     Comment: occasional    Drug use: No     Review of Systems   Constitutional: Negative.  Negative for fever.   HENT: Negative.    Eyes: Negative.    Respiratory: Negative.    Cardiovascular: Negative.  Negative for chest pain.   Gastrointestinal: Negative.    Endocrine: Negative.    Genitourinary: Negative.    Musculoskeletal: Negative.    Skin: Negative.         Pain and redness and swelling and tenderness on the left side of the scalp with a possible insect bite   Allergic/Immunologic: Negative.    Neurological: Negative.    Hematological: Negative.    Psychiatric/Behavioral: Negative.    All other systems reviewed and are negative.      Physical Exam     Initial Vitals [09/15/19 0524]   BP Pulse Resp Temp SpO2   130/76 89 16 98.4 °F (36.9 °C) 99 %      MAP       --         Physical Exam    Nursing note and vitals reviewed.  Constitutional: She appears well-developed and well-nourished.   HENT:   Head: Normocephalic and atraumatic.   Nose: Nose normal.   Mouth/Throat: Oropharynx is clear and moist.   Eyes: Conjunctivae and  EOM are normal. Pupils are equal, round, and reactive to light.   Neck: Normal range of motion. Neck supple. No thyromegaly present. No tracheal deviation present. No JVD present.   Cardiovascular: Normal rate, regular rhythm, normal heart sounds and intact distal pulses.   No murmur heard.  Pulmonary/Chest: Breath sounds normal. No stridor. No respiratory distress. She has no wheezes. She has no rales.   Abdominal: Soft. Bowel sounds are normal.   Musculoskeletal: Normal range of motion. She exhibits no edema.   Neurological: She is alert and oriented to person, place, and time.   Skin: Skin is warm. Capillary refill takes less than 2 seconds. Abscess noted. There is erythema.   Left temporal area with the redness and tenderness and induration consistent with cellulitis versus a very small abscess.   Psychiatric: She has a normal mood and affect. Thought content normal.         ED Course   I & D - Incision and Drainage  Date/Time: 9/15/2019 7:51 AM  Performed by: Americo Dawson MD  Authorized by: Americo Dawson MD   Type: abscess  Location: Left temporal area.  Anesthesia: local infiltration    Anesthesia:  Local Anesthetic: lidocaine 1% without epinephrine  Patient sedated: no  Scalpel size: 11  Complications: No  Estimated blood loss (mL): 1  Specimens: No  Implants: No        Labs Reviewed - No data to display       Imaging Results    None          Medical Decision Making:   Differential Diagnosis:   Patient with early left temporal scalp cellulitis versus abscess.  Incision and drainage done and no pus drain.  This is consistent with cellulitis and not an abscess and given this presentation treated with Bactrim and Bactroban.  Pain treated.  Patient felt better and discharged home with instructions and follow-up                      Clinical Impression:       ICD-10-CM ICD-9-CM   1. Abscess L02.91 682.9   2. Cellulitis, unspecified cellulitis site L03.90 682.9                                Americo Dawson MD  09/15/19  0221

## 2019-09-15 NOTE — DISCHARGE INSTRUCTIONS
Take medication as directed.  Return to emergency department for worsening symptoms or any problems.  Wash wound with soap and water 3 to 4 times a day.  Return if no improvement in 2 days.

## 2024-05-13 ENCOUNTER — TELEPHONE (OUTPATIENT)
Dept: FAMILY MEDICINE | Facility: CLINIC | Age: 49
End: 2024-05-13
Payer: COMMERCIAL

## 2024-05-13 NOTE — TELEPHONE ENCOUNTER
Called patient to remind her of new patient appointment. No answer and no voice mail. No other phone number. Will try again this Wednesday.

## 2024-05-16 ENCOUNTER — TELEPHONE (OUTPATIENT)
Dept: FAMILY MEDICINE | Facility: CLINIC | Age: 49
End: 2024-05-16
Payer: COMMERCIAL

## 2024-05-16 NOTE — TELEPHONE ENCOUNTER
Called patient for second time to confirm her appointment with dr. Garcia. No answer and no voicemail.

## 2024-05-23 ENCOUNTER — OFFICE VISIT (OUTPATIENT)
Dept: FAMILY MEDICINE | Facility: CLINIC | Age: 49
End: 2024-05-23
Payer: COMMERCIAL

## 2024-05-23 VITALS
HEART RATE: 92 BPM | HEIGHT: 63 IN | BODY MASS INDEX: 42.35 KG/M2 | SYSTOLIC BLOOD PRESSURE: 146 MMHG | DIASTOLIC BLOOD PRESSURE: 94 MMHG | WEIGHT: 239 LBS | OXYGEN SATURATION: 99 %

## 2024-05-23 DIAGNOSIS — Z80.0 FAMILY HISTORY OF COLON CANCER IN MOTHER: ICD-10-CM

## 2024-05-23 DIAGNOSIS — Z00.00 ANNUAL PHYSICAL EXAM: Primary | ICD-10-CM

## 2024-05-23 DIAGNOSIS — E66.01 CLASS 3 SEVERE OBESITY DUE TO EXCESS CALORIES WITHOUT SERIOUS COMORBIDITY WITH BODY MASS INDEX (BMI) OF 40.0 TO 44.9 IN ADULT: ICD-10-CM

## 2024-05-23 DIAGNOSIS — Z13.6 SCREENING FOR ISCHEMIC HEART DISEASE (IHD): ICD-10-CM

## 2024-05-23 DIAGNOSIS — Z13.0 SCREENING FOR DEFICIENCY ANEMIA: ICD-10-CM

## 2024-05-23 DIAGNOSIS — Z12.31 ENCOUNTER FOR SCREENING MAMMOGRAM FOR MALIGNANT NEOPLASM OF BREAST: ICD-10-CM

## 2024-05-23 DIAGNOSIS — Z13.29 SCREENING FOR ENDOCRINE DISORDER: ICD-10-CM

## 2024-05-23 DIAGNOSIS — N95.1 PERIMENOPAUSE: ICD-10-CM

## 2024-05-23 PROCEDURE — 99203 OFFICE O/P NEW LOW 30 MIN: CPT | Mod: S$GLB,,, | Performed by: FAMILY MEDICINE

## 2024-05-23 PROCEDURE — 3080F DIAST BP >= 90 MM HG: CPT | Mod: CPTII,S$GLB,, | Performed by: FAMILY MEDICINE

## 2024-05-23 PROCEDURE — 3077F SYST BP >= 140 MM HG: CPT | Mod: CPTII,S$GLB,, | Performed by: FAMILY MEDICINE

## 2024-05-23 PROCEDURE — 1159F MED LIST DOCD IN RCRD: CPT | Mod: CPTII,S$GLB,, | Performed by: FAMILY MEDICINE

## 2024-05-23 PROCEDURE — 3008F BODY MASS INDEX DOCD: CPT | Mod: CPTII,S$GLB,, | Performed by: FAMILY MEDICINE

## 2024-05-23 RX ORDER — FEZOLINETANT 45 MG/1
45 TABLET, FILM COATED ORAL DAILY
Qty: 30 TABLET | Refills: 5 | Status: SHIPPED | OUTPATIENT
Start: 2024-05-23 | End: 2024-05-23 | Stop reason: SDUPTHER

## 2024-05-23 RX ORDER — ACETAMINOPHEN 325 MG/1
325 TABLET ORAL EVERY 6 HOURS PRN
COMMUNITY

## 2024-05-23 RX ORDER — FEZOLINETANT 45 MG/1
45 TABLET, FILM COATED ORAL DAILY
Qty: 30 TABLET | Refills: 5 | Status: SHIPPED | OUTPATIENT
Start: 2024-05-23 | End: 2024-05-31

## 2024-05-23 NOTE — PROGRESS NOTES
SUBJECTIVE:   HPI: Noris Lo  is a 48 y.o. female who presents for annual physical .   Establish Care, discuss possible menopause, and requests routine blood work    Patient presents to establish care.  She reports she has not had a menstrual cycle in close to 11 months.  Reports hot flashes that affect her mostly at night.      She has a family history of colon cancer in her mother.  Mother developed at age 48.  Patient has had 1 colonoscopy in her 30s.  Does not report an abnormality.  Several years since mammogram and pap. Does not repot a history of abnormals    Blood pressure elevated upon presentation but most recent visits in the last 5 years were normal.  She has not currently on any long-term medications.      Patient is up-to-date with tetanus and COVID vaccinations.      (Not in a hospital admission)    Review of patient's allergies indicates:  No Known Allergies  Current Outpatient Medications on File Prior to Visit   Medication Sig Dispense Refill    acetaminophen (TYLENOL) 325 MG tablet Take 325 mg by mouth every 6 (six) hours as needed for Pain.       No current facility-administered medications on file prior to visit.     History reviewed. No pertinent past medical history.  Past Surgical History:   Procedure Laterality Date    HERNIA REPAIR      umbilical x2     Family History   Problem Relation Name Age of Onset    Colon cancer Mother  48    Cirrhosis Father      Liver cancer Father       Social History     Tobacco Use    Smoking status: Never    Smokeless tobacco: Never   Substance Use Topics    Alcohol use: Never    Drug use: No      Health Maintenance Topics with due status: Not Due       Topic Last Completion Date    TETANUS VACCINE 06/24/2019    Influenza Vaccine Not Due     Immunization History   Administered Date(s) Administered    COVID-19, MRNA, LN-S, PF (MODERNA FULL 0.5 ML DOSE) 03/30/2021, 05/04/2021    Hepatitis B, Adult 06/26/2019, 07/26/2019, 10/16/2019    Td (Adult),  "Unspecified Formulation 06/04/2015    Tdap 06/24/2019       Review of Systems   Constitutional:  Negative for activity change, fatigue and unexpected weight change.   HENT:  Negative for hearing loss, postnasal drip, sinus pressure, sore throat and voice change.    Eyes:  Negative for photophobia and visual disturbance.   Respiratory:  Negative for cough, shortness of breath and wheezing.    Cardiovascular:  Negative for chest pain and palpitations.   Gastrointestinal:  Negative for constipation, diarrhea and nausea.   Endocrine: Positive for heat intolerance.   Genitourinary:  Positive for menstrual problem. Negative for difficulty urinating, frequency, hematuria and urgency.   Musculoskeletal:  Negative for arthralgias and back pain.   Skin:  Negative for rash.   Neurological:  Negative for weakness, light-headedness and headaches.   Hematological:  Negative for adenopathy. Does not bruise/bleed easily.   Psychiatric/Behavioral:  Positive for sleep disturbance. The patient is not nervous/anxious.       OBJECTIVE:          9/15/2019     5:24 AM 5/23/2024     3:57 PM   Vitals - 1 value per visit   SYSTOLIC 130 146   DIASTOLIC 76 94   Pulse 89 92   Temp 98.4 °F (36.9 °C)    Resp 16    SPO2 99 % 99 %   Weight (lb) 232 239   Weight (kg) 105.235 108.41   Height 5' 3" (1.6 m) 5' 3" (1.6 m)   BMI (Calculated) 41.2 42.3      Physical Exam  Vitals reviewed.   Constitutional:       General: She is not in acute distress.     Appearance: Normal appearance. She is well-developed. She is obese.   HENT:      Head: Normocephalic and atraumatic.      Right Ear: External ear normal.      Left Ear: External ear normal.      Nose: Nose normal.      Mouth/Throat:      Mouth: Mucous membranes are moist.   Eyes:      General: Lids are normal.      Conjunctiva/sclera: Conjunctivae normal.      Pupils: Pupils are equal, round, and reactive to light.   Neck:      Thyroid: No thyromegaly.      Vascular: No JVD.      Trachea: No tracheal " deviation.   Cardiovascular:      Rate and Rhythm: Normal rate and regular rhythm.      Chest Wall: PMI is not displaced.      Pulses: Normal pulses.      Heart sounds: Normal heart sounds.   Pulmonary:      Effort: Pulmonary effort is normal.      Breath sounds: Normal breath sounds.   Abdominal:      General: Bowel sounds are normal.      Palpations: Abdomen is soft.      Tenderness: There is no abdominal tenderness. There is no guarding or rebound.   Musculoskeletal:         General: No tenderness. Normal range of motion.      Cervical back: Full passive range of motion without pain and neck supple.   Skin:     General: Skin is warm and dry.      Findings: No rash.   Neurological:      General: No focal deficit present.      Mental Status: She is alert and oriented to person, place, and time.   Psychiatric:         Mood and Affect: Mood normal.         Behavior: Behavior normal.        Assessment:       1. Annual physical exam    2. Family history of colon cancer in mother    3. Perimenopause    4. Screening for deficiency anemia    5. Class 3 severe obesity due to excess calories without serious comorbidity with body mass index (BMI) of 40.0 to 44.9 in adult    6. Screening for ischemic heart disease (IHD)    7. Encounter for screening mammogram for malignant neoplasm of breast    8. Screening for endocrine disorder        Plan:       Annual physical exam  -     CBC Auto Differential; Future; Expected date: 05/23/2024  -     Comprehensive Metabolic Panel; Future; Expected date: 05/23/2024    Family history of colon cancer in mother  -     Ambulatory referral/consult to Gastroenterology; Future; Expected date: 05/30/2024    Perimenopause  -     FOLLICLE STIMULATING HORMONE; Future; Expected date: 05/23/2024  -     Discontinue: fezolinetant (VEOZAH) 45 mg Tab; Take 45 mg by mouth once daily. For hot flashes  Dispense: 30 tablet; Refill: 5  -     fezolinetant (VEOZAH) 45 mg Tab; Take 45 mg by mouth once daily. For  hot flashes  Dispense: 30 tablet; Refill: 5    Screening for deficiency anemia  -     CBC Auto Differential; Future; Expected date: 05/23/2024    Class 3 severe obesity due to excess calories without serious comorbidity with body mass index (BMI) of 40.0 to 44.9 in adult  -     CBC Auto Differential; Future; Expected date: 05/23/2024  -     Comprehensive Metabolic Panel; Future; Expected date: 05/23/2024  -     Hemoglobin A1C; Future; Expected date: 05/23/2024    Screening for ischemic heart disease (IHD)  -     Lipid Panel; Future; Expected date: 05/23/2024    Encounter for screening mammogram for malignant neoplasm of breast  -     Mammo Digital Screening Bilat w/ Deo; Future; Expected date: 05/23/2024    Screening for endocrine disorder  -     Hemoglobin A1C; Future; Expected date: 05/23/2024        Counseled on age and gender appropriate medical preventative services, including cancer screenings, immunizations, overall nutritional health, need for a consistent exercise regimen and an overall push towards maintaining a vigorous and active lifestyle.      Follow up in about 6 months (around 11/23/2024) for menopause.

## 2024-05-28 ENCOUNTER — TELEPHONE (OUTPATIENT)
Dept: GASTROENTEROLOGY | Facility: CLINIC | Age: 49
End: 2024-05-28
Payer: COMMERCIAL

## 2024-05-29 ENCOUNTER — TELEPHONE (OUTPATIENT)
Dept: FAMILY MEDICINE | Facility: CLINIC | Age: 49
End: 2024-05-29
Payer: COMMERCIAL

## 2024-05-29 DIAGNOSIS — N95.1 PERIMENOPAUSE: Primary | ICD-10-CM

## 2024-05-29 NOTE — TELEPHONE ENCOUNTER
----- Message from Viridiana Torres sent at 5/29/2024  8:10 AM CDT -----  Pt said the prescription Dr. Garcia gave her the pharmacy said it is $600. She wants to know if you could give her something else.  [phone]

## 2024-05-30 LAB
ALBUMIN SERPL-MCNC: 4 G/DL (ref 3.6–5.1)
ALBUMIN/GLOB SERPL: 1.6 (CALC) (ref 1–2.5)
ALP SERPL-CCNC: 66 U/L (ref 31–125)
ALT SERPL-CCNC: 11 U/L (ref 6–29)
AST SERPL-CCNC: 17 U/L (ref 10–35)
BASOPHILS # BLD AUTO: 30 CELLS/UL (ref 0–200)
BASOPHILS NFR BLD AUTO: 0.5 %
BILIRUB SERPL-MCNC: 0.4 MG/DL (ref 0.2–1.2)
BUN SERPL-MCNC: 16 MG/DL (ref 7–25)
BUN/CREAT SERPL: NORMAL (CALC) (ref 6–22)
CALCIUM SERPL-MCNC: 9.3 MG/DL (ref 8.6–10.2)
CHLORIDE SERPL-SCNC: 105 MMOL/L (ref 98–110)
CHOLEST SERPL-MCNC: 191 MG/DL
CHOLEST/HDLC SERPL: 3.1 (CALC)
CO2 SERPL-SCNC: 31 MMOL/L (ref 20–32)
CREAT SERPL-MCNC: 0.81 MG/DL (ref 0.5–0.99)
EGFR: 89 ML/MIN/1.73M2
EOSINOPHIL # BLD AUTO: 83 CELLS/UL (ref 15–500)
EOSINOPHIL NFR BLD AUTO: 1.4 %
ERYTHROCYTE [DISTWIDTH] IN BLOOD BY AUTOMATED COUNT: 12.7 % (ref 11–15)
FSH SERPL-ACNC: 62.4 MIU/ML
GLOBULIN SER CALC-MCNC: 2.5 G/DL (CALC) (ref 1.9–3.7)
GLUCOSE SERPL-MCNC: 87 MG/DL (ref 65–99)
HBA1C MFR BLD: 5.6 % OF TOTAL HGB
HCT VFR BLD AUTO: 38.3 % (ref 35–45)
HDLC SERPL-MCNC: 61 MG/DL
HGB BLD-MCNC: 12.5 G/DL (ref 11.7–15.5)
LDLC SERPL CALC-MCNC: 110 MG/DL (CALC)
LYMPHOCYTES # BLD AUTO: 2289 CELLS/UL (ref 850–3900)
LYMPHOCYTES NFR BLD AUTO: 38.8 %
MCH RBC QN AUTO: 29.8 PG (ref 27–33)
MCHC RBC AUTO-ENTMCNC: 32.6 G/DL (ref 32–36)
MCV RBC AUTO: 91.2 FL (ref 80–100)
MONOCYTES # BLD AUTO: 472 CELLS/UL (ref 200–950)
MONOCYTES NFR BLD AUTO: 8 %
NEUTROPHILS # BLD AUTO: 3027 CELLS/UL (ref 1500–7800)
NEUTROPHILS NFR BLD AUTO: 51.3 %
NONHDLC SERPL-MCNC: 130 MG/DL (CALC)
PLATELET # BLD AUTO: 215 THOUSAND/UL (ref 140–400)
PMV BLD REES-ECKER: 10.8 FL (ref 7.5–12.5)
POTASSIUM SERPL-SCNC: 4.8 MMOL/L (ref 3.5–5.3)
PROT SERPL-MCNC: 6.5 G/DL (ref 6.1–8.1)
RBC # BLD AUTO: 4.2 MILLION/UL (ref 3.8–5.1)
SODIUM SERPL-SCNC: 142 MMOL/L (ref 135–146)
TRIGL SERPL-MCNC: 94 MG/DL
WBC # BLD AUTO: 5.9 THOUSAND/UL (ref 3.8–10.8)

## 2024-05-31 ENCOUNTER — HOSPITAL ENCOUNTER (OUTPATIENT)
Dept: RADIOLOGY | Facility: HOSPITAL | Age: 49
Discharge: HOME | End: 2024-05-31
Attending: FAMILY MEDICINE
Payer: COMMERCIAL

## 2024-05-31 DIAGNOSIS — Z12.31 ENCOUNTER FOR SCREENING MAMMOGRAM FOR MALIGNANT NEOPLASM OF BREAST: ICD-10-CM

## 2024-05-31 PROCEDURE — 77067 SCR MAMMO BI INCL CAD: CPT | Mod: 26,,, | Performed by: RADIOLOGY

## 2024-05-31 PROCEDURE — 77063 BREAST TOMOSYNTHESIS BI: CPT | Mod: 26,,, | Performed by: RADIOLOGY

## 2024-05-31 PROCEDURE — 77067 SCR MAMMO BI INCL CAD: CPT | Mod: TC

## 2024-05-31 RX ORDER — PAROXETINE HYDROCHLORIDE 20 MG/1
20 TABLET, FILM COATED ORAL EVERY MORNING
Qty: 30 TABLET | Refills: 11 | Status: SHIPPED | OUTPATIENT
Start: 2024-05-31 | End: 2025-05-31

## 2024-11-27 ENCOUNTER — OFFICE VISIT (OUTPATIENT)
Dept: FAMILY MEDICINE | Facility: CLINIC | Age: 49
End: 2024-11-27
Payer: COMMERCIAL

## 2024-11-27 VITALS
HEIGHT: 63 IN | DIASTOLIC BLOOD PRESSURE: 82 MMHG | SYSTOLIC BLOOD PRESSURE: 134 MMHG | BODY MASS INDEX: 41.82 KG/M2 | HEART RATE: 80 BPM | WEIGHT: 236 LBS

## 2024-11-27 DIAGNOSIS — Z78.0 POSTMENOPAUSAL: Primary | ICD-10-CM

## 2024-11-27 DIAGNOSIS — E66.01 CLASS 3 SEVERE OBESITY DUE TO EXCESS CALORIES WITHOUT SERIOUS COMORBIDITY WITH BODY MASS INDEX (BMI) OF 40.0 TO 44.9 IN ADULT: ICD-10-CM

## 2024-11-27 DIAGNOSIS — J30.2 SEASONAL ALLERGIES: ICD-10-CM

## 2024-11-27 DIAGNOSIS — Z80.0 FAMILY HISTORY OF COLON CANCER IN MOTHER: ICD-10-CM

## 2024-11-27 DIAGNOSIS — E66.813 CLASS 3 SEVERE OBESITY DUE TO EXCESS CALORIES WITHOUT SERIOUS COMORBIDITY WITH BODY MASS INDEX (BMI) OF 40.0 TO 44.9 IN ADULT: ICD-10-CM

## 2024-11-27 DIAGNOSIS — Z12.11 SCREENING FOR MALIGNANT NEOPLASM OF COLON: ICD-10-CM

## 2024-11-27 PROCEDURE — 99214 OFFICE O/P EST MOD 30 MIN: CPT | Mod: S$GLB,,, | Performed by: FAMILY MEDICINE

## 2024-11-27 PROCEDURE — 3044F HG A1C LEVEL LT 7.0%: CPT | Mod: CPTII,S$GLB,, | Performed by: FAMILY MEDICINE

## 2024-11-27 PROCEDURE — 3008F BODY MASS INDEX DOCD: CPT | Mod: CPTII,S$GLB,, | Performed by: FAMILY MEDICINE

## 2024-11-27 PROCEDURE — 3075F SYST BP GE 130 - 139MM HG: CPT | Mod: CPTII,S$GLB,, | Performed by: FAMILY MEDICINE

## 2024-11-27 PROCEDURE — 1159F MED LIST DOCD IN RCRD: CPT | Mod: CPTII,S$GLB,, | Performed by: FAMILY MEDICINE

## 2024-11-27 PROCEDURE — 3079F DIAST BP 80-89 MM HG: CPT | Mod: CPTII,S$GLB,, | Performed by: FAMILY MEDICINE

## 2024-11-27 NOTE — PROGRESS NOTES
SUBJECTIVE:    Patient ID: Noris Lo is a 48 y.o. female.    Chief Complaint: Follow-up (6 mo f/u//no med bottles//complains of cough for 3 days and sinus drip//discuss side effects of paxil//declined flu vac//amb ref to Dr. Parker//tc)    Recently establish patient presents for follow-up visit.  We were monitoring her blood pressures as they were elevated on presentation 6 months ago.  She does have family history of hypertension but her values have been fine.    She has had mammogram which was normal.  Lab work was performed that showed no diabetes and normal CBC.  She had also been experiencing perimenopausal hot flashes and an FSH was done that revealed patient was menopause.  Paroxetine low-dose was started for her hot flashes and she has found this beneficial.  She does report some decreased libido but she is not interested in using anything for that at this time.    Having some seasonal allergies and not using any medications.      Patient has family history of colon cancer and had been referred for case request unfortunately she was not able to received a phone call and information.  Request has been done again.          No visits with results within 6 Month(s) from this visit.   Latest known visit with results is:   Office Visit on 05/23/2024   Component Date Value Ref Range Status    WBC 05/29/2024 5.9  3.8 - 10.8 Thousand/uL Final    RBC 05/29/2024 4.20  3.80 - 5.10 Million/uL Final    Hemoglobin 05/29/2024 12.5  11.7 - 15.5 g/dL Final    Hematocrit 05/29/2024 38.3  35.0 - 45.0 % Final    MCV 05/29/2024 91.2  80.0 - 100.0 fL Final    MCH 05/29/2024 29.8  27.0 - 33.0 pg Final    MCHC 05/29/2024 32.6  32.0 - 36.0 g/dL Final    RDW 05/29/2024 12.7  11.0 - 15.0 % Final    Platelets 05/29/2024 215  140 - 400 Thousand/uL Final    MPV 05/29/2024 10.8  7.5 - 12.5 fL Final    Neutrophils, Abs 05/29/2024 3,027  1,500 - 7,800 cells/uL Final    Lymph # 05/29/2024 2,289  850 - 3,900 cells/uL Final    Mono #  05/29/2024 472  200 - 950 cells/uL Final    Eos # 05/29/2024 83  15 - 500 cells/uL Final    Baso # 05/29/2024 30  0 - 200 cells/uL Final    Neutrophils Relative 05/29/2024 51.3  % Final    Lymph % 05/29/2024 38.8  % Final    Mono % 05/29/2024 8.0  % Final    Eosinophil % 05/29/2024 1.4  % Final    Basophil % 05/29/2024 0.5  % Final    Glucose 05/29/2024 87  65 - 99 mg/dL Final    BUN 05/29/2024 16  7 - 25 mg/dL Final    Creatinine 05/29/2024 0.81  0.50 - 0.99 mg/dL Final    eGFR 05/29/2024 89  > OR = 60 mL/min/1.73m2 Final    BUN/Creatinine Ratio 05/29/2024 SEE NOTE:  6 - 22 (calc) Final    Sodium 05/29/2024 142  135 - 146 mmol/L Final    Potassium 05/29/2024 4.8  3.5 - 5.3 mmol/L Final    Chloride 05/29/2024 105  98 - 110 mmol/L Final    CO2 05/29/2024 31  20 - 32 mmol/L Final    Calcium 05/29/2024 9.3  8.6 - 10.2 mg/dL Final    Total Protein 05/29/2024 6.5  6.1 - 8.1 g/dL Final    Albumin 05/29/2024 4.0  3.6 - 5.1 g/dL Final    Globulin, Total 05/29/2024 2.5  1.9 - 3.7 g/dL (calc) Final    Albumin/Globulin Ratio 05/29/2024 1.6  1.0 - 2.5 (calc) Final    Total Bilirubin 05/29/2024 0.4  0.2 - 1.2 mg/dL Final    Alkaline Phosphatase 05/29/2024 66  31 - 125 U/L Final    AST 05/29/2024 17  10 - 35 U/L Final    ALT 05/29/2024 11  6 - 29 U/L Final    Cholesterol 05/29/2024 191  <200 mg/dL Final    HDL 05/29/2024 61  > OR = 50 mg/dL Final    Triglycerides 05/29/2024 94  <150 mg/dL Final    LDL Cholesterol 05/29/2024 110 (H)  mg/dL (calc) Final    HDL/Cholesterol Ratio 05/29/2024 3.1  <5.0 (calc) Final    Non HDL Chol. (LDL+VLDL) 05/29/2024 130 (H)  <130 mg/dL (calc) Final    Hemoglobin A1C 05/29/2024 5.6  <5.7 % of total Hgb Final    FSH 05/29/2024 62.4  mIU/mL Final        No past medical history on file.  Past Surgical History:   Procedure Laterality Date    HERNIA REPAIR      umbilical x2     Family History   Problem Relation Name Age of Onset    Colon cancer Mother  48    Cirrhosis Father      Liver cancer Father    "      Marital Status:   Alcohol History:  reports no history of alcohol use.  Tobacco History:  reports that she has never smoked. She has never used smokeless tobacco.  Drug History:  reports no history of drug use.    Review of patient's allergies indicates:  No Known Allergies    Current Outpatient Medications:     acetaminophen (TYLENOL) 325 MG tablet, Take 325 mg by mouth every 6 (six) hours as needed for Pain., Disp: , Rfl:     paroxetine (PAXIL) 20 MG tablet, Take 1 tablet (20 mg total) by mouth every morning., Disp: 30 tablet, Rfl: 11    Review of Systems   Constitutional:  Negative for activity change, fatigue and unexpected weight change.   HENT:  Negative for hearing loss, postnasal drip, sinus pressure, sore throat and voice change.    Eyes:  Negative for photophobia and visual disturbance.   Respiratory:  Negative for cough, shortness of breath and wheezing.    Cardiovascular:  Negative for chest pain and palpitations.   Gastrointestinal:  Negative for constipation, diarrhea and nausea.   Genitourinary:  Negative for difficulty urinating, frequency, hematuria and urgency.   Musculoskeletal:  Negative for arthralgias and back pain.   Skin:  Negative for rash.   Neurological:  Negative for weakness, light-headedness and headaches.   Hematological:  Negative for adenopathy. Does not bruise/bleed easily.   Psychiatric/Behavioral:  The patient is not nervous/anxious.           Objective:          5/23/2024     3:57 PM 11/27/2024     3:35 PM   Vitals - 1 value per visit   SYSTOLIC 146 134   DIASTOLIC 94 82   Pulse 92 80   SPO2 99 %    Weight (lb) 239 236   Weight (kg) 108.41 107.049   Height 5' 3" (1.6 m) 5' 3" (1.6 m)   BMI (Calculated) 42.3 41.8      Physical Exam  Constitutional:       Appearance: Normal appearance. She is obese.   HENT:      Head: Normocephalic and atraumatic.      Mouth/Throat:      Mouth: Mucous membranes are moist.   Eyes:      Conjunctiva/sclera: Conjunctivae normal. "   Pulmonary:      Effort: Pulmonary effort is normal.   Neurological:      General: No focal deficit present.      Mental Status: She is alert and oriented to person, place, and time.   Psychiatric:         Mood and Affect: Mood normal.         Behavior: Behavior normal.           Assessment:       1. Postmenopausal    2. Screening for malignant neoplasm of colon    3. Family history of colon cancer in mother    4. Class 3 severe obesity due to excess calories without serious comorbidity with body mass index (BMI) of 40.0 to 44.9 in adult    5. Seasonal allergies         Plan:       Postmenopausal  Continue paxil for symptoms    Screening for malignant neoplasm of colon  -     Case Request Endoscopy: COLONOSCOPY    Family history of colon cancer in mother  -     Case Request Endoscopy: COLONOSCOPY    Class 3 severe obesity due to excess calories without serious comorbidity with body mass index (BMI) of 40.0 to 44.9 in adult  Nutritional counseling  Seasonal allergies  Claritin and flonase for symptoms    Medication List with Changes/Refills   Current Medications    ACETAMINOPHEN (TYLENOL) 325 MG TABLET    Take 325 mg by mouth every 6 (six) hours as needed for Pain.    PAROXETINE (PAXIL) 20 MG TABLET    Take 1 tablet (20 mg total) by mouth every morning.      No follow-ups on file.      Stable on medications continue current

## 2024-12-02 ENCOUNTER — PATIENT MESSAGE (OUTPATIENT)
Dept: GASTROENTEROLOGY | Facility: CLINIC | Age: 49
End: 2024-12-02
Payer: COMMERCIAL

## 2024-12-09 ENCOUNTER — TELEPHONE (OUTPATIENT)
Dept: FAMILY MEDICINE | Facility: CLINIC | Age: 49
End: 2024-12-09
Payer: COMMERCIAL

## 2024-12-09 NOTE — TELEPHONE ENCOUNTER
----- Message from Kylee sent at 12/9/2024 12:38 PM CST -----  Type:  Needs Medical Advice    Who Called: pt    Would the patient rather a call back or a response via [facility]? Call back    Best Call Back Number: [phone]      Additional Information: pt is wanting to schedule colonoscopy.   Please advise. Thank you!

## 2024-12-09 NOTE — TELEPHONE ENCOUNTER
Spoke with pt gave contact info. Advised she needs to speak with gastro. Pt voiced understanding.

## 2024-12-09 NOTE — TELEPHONE ENCOUNTER
----- Message from Pily sent at 12/9/2024 11:15 AM CST -----  Referral for colonoscopy no one has f/u with patient. So patient is asking for the office information so she is able to contact this office.   [phone]

## 2024-12-10 ENCOUNTER — TELEPHONE (OUTPATIENT)
Dept: GASTROENTEROLOGY | Facility: CLINIC | Age: 49
End: 2024-12-10
Payer: COMMERCIAL

## 2024-12-10 NOTE — TELEPHONE ENCOUNTER
----- Message from Alejandra sent at 12/10/2024  9:57 AM CST -----  Type:  Patient Returning Call    Who Called:  self     Who Left Message for Patient:  Spencer Arellano,    Does the patient know what this is regarding?:  yes     Would the patient rather a call back or a response via [facility]?  call    Best Call Back Number: .[phone] (home)

## 2025-01-10 ENCOUNTER — ANESTHESIA (OUTPATIENT)
Dept: ENDOSCOPY | Facility: HOSPITAL | Age: 50
End: 2025-01-10
Payer: COMMERCIAL

## 2025-01-10 ENCOUNTER — ANESTHESIA EVENT (OUTPATIENT)
Dept: ENDOSCOPY | Facility: HOSPITAL | Age: 50
End: 2025-01-10
Payer: COMMERCIAL

## 2025-01-10 ENCOUNTER — HOSPITAL ENCOUNTER (OUTPATIENT)
Facility: HOSPITAL | Age: 50
Discharge: HOME | End: 2025-01-10
Attending: INTERNAL MEDICINE | Admitting: INTERNAL MEDICINE
Payer: COMMERCIAL

## 2025-01-10 DIAGNOSIS — Z80.0 FAMILY HISTORY OF COLON CANCER: ICD-10-CM

## 2025-01-10 PROCEDURE — 45385 COLONOSCOPY W/LESION REMOVAL: CPT | Mod: 33,,, | Performed by: INTERNAL MEDICINE

## 2025-01-10 PROCEDURE — 27201089 HC SNARE, DISP (ANY): Performed by: INTERNAL MEDICINE

## 2025-01-10 PROCEDURE — 25000003 PHARM REV CODE 250: Performed by: INTERNAL MEDICINE

## 2025-01-10 PROCEDURE — 45385 COLONOSCOPY W/LESION REMOVAL: CPT | Mod: 33 | Performed by: INTERNAL MEDICINE

## 2025-01-10 PROCEDURE — 63600175 PHARM REV CODE 636 W HCPCS: Performed by: NURSE ANESTHETIST, CERTIFIED REGISTERED

## 2025-01-10 PROCEDURE — 37000008 HC ANESTHESIA 1ST 15 MINUTES: Performed by: INTERNAL MEDICINE

## 2025-01-10 PROCEDURE — 37000009 HC ANESTHESIA EA ADD 15 MINS: Performed by: INTERNAL MEDICINE

## 2025-01-10 RX ORDER — LIDOCAINE HYDROCHLORIDE 10 MG/ML
INJECTION, SOLUTION EPIDURAL; INFILTRATION; INTRACAUDAL; PERINEURAL
Status: DISCONTINUED | OUTPATIENT
Start: 2025-01-10 | End: 2025-01-10

## 2025-01-10 RX ORDER — SODIUM CHLORIDE 9 MG/ML
INJECTION, SOLUTION INTRAVENOUS CONTINUOUS
Status: DISCONTINUED | OUTPATIENT
Start: 2025-01-10 | End: 2025-01-10 | Stop reason: HOSPADM

## 2025-01-10 RX ORDER — PROPOFOL 10 MG/ML
VIAL (ML) INTRAVENOUS
Status: DISCONTINUED | OUTPATIENT
Start: 2025-01-10 | End: 2025-01-10

## 2025-01-10 RX ADMIN — PROPOFOL 120 MG: 10 INJECTION, EMULSION INTRAVENOUS at 11:01

## 2025-01-10 RX ADMIN — PROPOFOL 30 MG: 10 INJECTION, EMULSION INTRAVENOUS at 11:01

## 2025-01-10 RX ADMIN — SODIUM CHLORIDE: 9 INJECTION, SOLUTION INTRAVENOUS at 10:01

## 2025-01-10 RX ADMIN — LIDOCAINE HYDROCHLORIDE 20 MG: 10 INJECTION, SOLUTION EPIDURAL; INFILTRATION; INTRACAUDAL; PERINEURAL at 11:01

## 2025-01-10 NOTE — ANESTHESIA POSTPROCEDURE EVALUATION
Anesthesia Post Evaluation    Patient: Noris Lo    Procedure(s) Performed: Procedure(s) (LRB):  COLONOSCOPY, SCREENING, LOW RISK PATIENT (N/A)    Final Anesthesia Type: general      Patient location during evaluation: PACU  Patient participation: Yes- Able to Participate  Level of consciousness: awake and alert and oriented  Post-procedure vital signs: reviewed and stable  Pain management: adequate  Airway patency: patent    PONV status at discharge: No PONV  Anesthetic complications: no      Cardiovascular status: blood pressure returned to baseline and stable  Respiratory status: unassisted and spontaneous ventilation  Hydration status: euvolemic  Follow-up not needed.              Vitals Value Taken Time   /102 01/10/25 1200   Temp 36.4 °C (97.5 °F) 01/10/25 1155   Pulse 72 01/10/25 1200   Resp 26 01/10/25 1200   SpO2 99 % 01/10/25 1200   Vitals shown include unfiled device data.      No case tracking events are documented in the log.      Pain/Vito Score: Vito Score: 10 (1/10/2025 11:55 AM)

## 2025-01-10 NOTE — H&P
[facility] Note    CC: Family history of colon cancer    HPI 49 y.o. female presents for initial colonoscopy . Her mother had colon cancer    History reviewed. No pertinent past medical history.    Allergies and Medications reviewed     Review of Systems  General ROS: negative for - chills, fever or weight loss  Cardiovascular ROS: no chest pain or dyspnea on exertion  Gastrointestinal ROS: no blood in stool    Physical Examination  LMP 09/09/2019   General appearance: alert, cooperative, no distress  HENT: Normocephalic, atraumatic, neck symmetrical, no nasal discharge, sclera anicteric   Lungs: clear to auscultation bilaterally, symmetric chest wall expansion bilaterally  Heart: regular rate and rhythm without rub; no displacement of the PMI   Abdomen: soft  Extremities: extremities symmetric; no clubbing, cyanosis, or edema        Labs:  Lab Results   Component Value Date    WBC 5.9 05/29/2024    HGB 12.5 05/29/2024    HCT 38.3 05/29/2024    MCV 91.2 05/29/2024     05/29/2024         Assessment:   49 y.o. female presents for colonoscopy     Plan:  PRoceed to colonoscopy     Ayde Jerez MD  [facility]  [address], [address]  [place] [address]  Office: [phone]  Fax: [phone]

## 2025-01-10 NOTE — [employer] PATIENT INSTRUCTIONS
Discharge Summary/Instructions after an Endoscopic Procedure  Patient Name: Noris Lo  Patient MRN: [id number]  Patient YOB: 1975  Friday, January 10, 2025  Ayde Jerez MD  Dear patient,  As a result of recent federal legislation (The Federal Cures Act), you may   receive lab or pathology results from your procedure in your [facility]   account before your physician is able to contact you. Your physician or   their representative will relay the results to you with their   recommendations at their soonest availability.  Thank you,  RESTRICTIONS:  During your procedure today, you received medications for sedation.  These   medications may affect your judgment, balance and coordination.  Therefore,   for 24 hours, you have the following restrictions:   - DO NOT drive a car, operate machinery, make legal/financial decisions,   sign important papers or drink alcohol.    ACTIVITY:  Today: no heavy lifting, straining or running due to procedural   sedation/anesthesia.  The following day: return to full activity including work.  DIET:  Eat and drink normally unless instructed otherwise.     TREATMENT FOR COMMON SIDE EFFECTS:  - Mild abdominal pain, nausea, belching, bloating or excessive gas:  rest,   eat lightly and use a heating pad.  - Sore Throat: treat with throat lozenges and/or gargle with warm salt   water.  - Because air was used during the procedure, expelling large amounts of air   from your rectum or belching is normal.  - If a bowel prep was taken, you may not have a bowel movement for 1-3 days.    This is normal.  SYMPTOMS TO WATCH FOR AND REPORT TO YOUR PHYSICIAN:  1. Abdominal pain or bloating, other than gas cramps.  2. Chest pain.  3. Back pain.  4. Signs of infection such as: chills or fever occurring within 24 hours   after the procedure.  5. Rectal bleeding, which would show as bright red, maroon, or black stools.   (A tablespoon of blood from the rectum is not serious,  especially if   hemorrhoids are present.)  6. Vomiting.  7. Weakness or dizziness.  GO DIRECTLY TO THE NEAREST EMERGENCY ROOM IF YOU HAVE ANY OF THE FOLLOWING:      Difficulty breathing              Chills and/or fever over 101 F   Persistent vomiting and/or vomiting blood   Severe abdominal pain   Severe chest pain   Black, tarry stools   Bleeding- more than one tablespoon   Any other symptom or condition that you feel may need urgent attention  Your doctor recommends these additional instructions:  If any biopsies were taken, your doctors clinic will contact you in 1 to 2   weeks with any results.  - Discharge patient to home (with escort).   - Patient has a contact number available for emergencies.  The signs and   symptoms of potential delayed complications were discussed with the   patient.  Return to normal activities tomorrow.  Written discharge   instructions were provided to the patient.   - Resume previous diet.   - Continue present medications.   - Await pathology results.   - Repeat colonoscopy in 5 years for surveillance based on pathology results.     - Return to my office PRN.  For questions, problems or results please call your physician - Ayde Jerez MD at Work:  [phone].  [facility], EMERGENCY ROOM PHONE NUMBER: [phone]  IF A COMPLICATION OR EMERGENCY SITUATION ARISES AND YOU ARE UNABLE TO REACH   YOUR PHYSICIAN - GO DIRECTLY TO THE EMERGENCY ROOM.  Ayde Jerez MD  1/10/2025 11:38:31 AM  This report has been verified and signed electronically.  Dear patient,  As a result of recent federal legislation (The Federal Cures Act), you may   receive lab or pathology results from your procedure in your [facility]   account before your physician is able to contact you. Your physician or   their representative will relay the results to you with their   recommendations at their soonest availability.  Thank you,  [employer]ATION

## 2025-01-10 NOTE — ANESTHESIA PREPROCEDURE EVALUATION
01/10/2025  Noris Lo is a 49 y.o., female.      Pre-op Assessment    I have reviewed the Patient Summary Reports.     I have reviewed the Nursing Notes. I have reviewed the NPO Status.   I have reviewed the Medications.     Review of Systems  Anesthesia Hx:  No problems with previous Anesthesia                Social:  Non-Smoker       Cardiovascular:  Cardiovascular Normal                                              Pulmonary:  Pulmonary Normal                       Renal/:  Renal/ Normal                 Neurological:  Neurology Normal                                      Endocrine:  Endocrine Normal          Obesity / BMI > 30      Physical Exam  General: Well nourished, Cooperative, Alert and Oriented    Airway:  Mallampati: II   Mouth Opening: Normal  TM Distance: Normal  Neck ROM: Normal ROM    Anesthesia Plan  Type of Anesthesia, risks & benefits discussed:    Anesthesia Type: Gen ETT, Gen Supraglottic Airway, Gen Natural Airway, MAC  Intra-op Monitoring Plan: Standard ASA Monitors  Post Op Pain Control Plan: multimodal analgesia  Induction:  IV  Airway Plan: Direct, Video and Fiberoptic, Post-Induction  Informed Consent: Informed consent signed with the Patient and all parties understand the risks and agree with anesthesia plan.  All questions answered.   ASA Score: 2    Ready For Surgery From Anesthesia Perspective.   .

## 2025-01-10 NOTE — PLAN OF CARE
Vss, miguel po fluids, denies pain, ambulates easily. IV removed, catheter intact. Discharge instructions provided and states understanding. States ready to go home.  Discharged from facility with family per wheelchair.

## 2025-01-10 NOTE — TRANSFER OF CARE
"Anesthesia Transfer of Care Note    Patient: Noris Lo    Procedure(s) Performed: Procedure(s) (LRB):  COLONOSCOPY, SCREENING, LOW RISK PATIENT (N/A)    Patient location: PACU    Anesthesia Type: general    Transport from OR: Transported from OR on room air with adequate spontaneous ventilation    Post pain: adequate analgesia    Post assessment: no apparent anesthetic complications and tolerated procedure well    Post vital signs: stable    Level of consciousness: sedated    Nausea/Vomiting: no nausea/vomiting    Complications: none    Transfer of care protocol was followed    Last vitals: Visit Vitals  BP (!) 178/96 (BP Location: Left arm, Patient Position: Lying)   Pulse 98   Temp 36.4 °C (97.5 °F) (Skin)   Resp 18   Ht 5' 4" (1.626 m)   Wt 84.8 kg (187 lb)   LMP 09/09/2019   SpO2 98%   Breastfeeding No   BMI 32.10 kg/m²     "

## 2025-01-13 VITALS
DIASTOLIC BLOOD PRESSURE: 91 MMHG | HEIGHT: 64 IN | RESPIRATION RATE: 27 BRPM | OXYGEN SATURATION: 99 % | HEART RATE: 78 BPM | WEIGHT: 187 LBS | SYSTOLIC BLOOD PRESSURE: 142 MMHG | BODY MASS INDEX: 31.92 KG/M2 | TEMPERATURE: 98 F

## 2025-02-14 ENCOUNTER — PATIENT MESSAGE (OUTPATIENT)
Dept: FAMILY MEDICINE | Facility: CLINIC | Age: 50
End: 2025-02-14
Payer: COMMERCIAL

## 2025-06-17 ENCOUNTER — TELEPHONE (OUTPATIENT)
Dept: FAMILY MEDICINE | Facility: CLINIC | Age: 50
End: 2025-06-17
Payer: COMMERCIAL

## 2025-06-17 NOTE — TELEPHONE ENCOUNTER
----- Message from Esther sent at 6/17/2025  8:25 AM CDT -----  Pt is having bad pains in her legs and would like to be seen   [phone]

## 2025-06-18 ENCOUNTER — HOSPITAL ENCOUNTER (OUTPATIENT)
Dept: RADIOLOGY | Facility: HOSPITAL | Age: 50
Discharge: HOME | End: 2025-06-18
Payer: COMMERCIAL

## 2025-06-18 ENCOUNTER — OFFICE VISIT (OUTPATIENT)
Dept: FAMILY MEDICINE | Facility: CLINIC | Age: 50
End: 2025-06-18
Payer: COMMERCIAL

## 2025-06-18 VITALS
DIASTOLIC BLOOD PRESSURE: 88 MMHG | OXYGEN SATURATION: 97 % | WEIGHT: 236 LBS | HEART RATE: 81 BPM | HEIGHT: 64 IN | SYSTOLIC BLOOD PRESSURE: 126 MMHG | BODY MASS INDEX: 40.29 KG/M2

## 2025-06-18 DIAGNOSIS — M79.604 RIGHT LEG PAIN: ICD-10-CM

## 2025-06-18 DIAGNOSIS — M79.89 RIGHT LEG SWELLING: ICD-10-CM

## 2025-06-18 DIAGNOSIS — M79.604 RIGHT LEG PAIN: Primary | ICD-10-CM

## 2025-06-18 DIAGNOSIS — Z12.31 OTHER SCREENING MAMMOGRAM: ICD-10-CM

## 2025-06-18 PROCEDURE — 93971 EXTREMITY STUDY: CPT | Mod: 26,RT,, | Performed by: RADIOLOGY

## 2025-06-18 PROCEDURE — 3079F DIAST BP 80-89 MM HG: CPT | Mod: CPTII,S$GLB,,

## 2025-06-18 PROCEDURE — 1159F MED LIST DOCD IN RCRD: CPT | Mod: CPTII,S$GLB,,

## 2025-06-18 PROCEDURE — 93971 EXTREMITY STUDY: CPT | Mod: TC,RT

## 2025-06-18 PROCEDURE — 93926 LOWER EXTREMITY STUDY: CPT | Mod: TC,RT

## 2025-06-18 PROCEDURE — 93926 LOWER EXTREMITY STUDY: CPT | Mod: 26,RT,, | Performed by: RADIOLOGY

## 2025-06-18 PROCEDURE — 99214 OFFICE O/P EST MOD 30 MIN: CPT | Mod: S$GLB,,,

## 2025-06-18 PROCEDURE — 3074F SYST BP LT 130 MM HG: CPT | Mod: CPTII,S$GLB,,

## 2025-06-18 PROCEDURE — 3008F BODY MASS INDEX DOCD: CPT | Mod: CPTII,S$GLB,,

## 2025-06-19 ENCOUNTER — RESULTS FOLLOW-UP (OUTPATIENT)
Dept: FAMILY MEDICINE | Facility: CLINIC | Age: 50
End: 2025-06-19
Payer: COMMERCIAL

## 2025-06-19 NOTE — PROGRESS NOTES
Let patient know that her ultrasound of her leg was normal for both veins and arteries. No concerns. Let us know if swelling returns.

## 2025-06-23 ENCOUNTER — PATIENT MESSAGE (OUTPATIENT)
Dept: ADMINISTRATIVE | Facility: HOSPITAL | Age: 50
End: 2025-06-23
Payer: COMMERCIAL

## 2025-06-25 NOTE — PROGRESS NOTES
SUBJECTIVE:    Patient ID: Noris Lo is a 49 y.o. female.    Chief Complaint: Leg Swelling (No bottles//Pt is here to discuss swelling in her right leg x 3 days//Pt stated that she had Mammo done in 12/2024//ROC)    HPI  History of Present Illness    CHIEF COMPLAINT:  Noris presents today with unilateral leg swelling.    HISTORY OF PRESENT ILLNESS:  She reports right leg swelling that was notably larger and warmer compared to the left leg, accompanied by pain localized to the bottom of her heel. She denies any prior history of blood clots. She has been wearing compression socks daily and reports reduced swelling with nighttime use. She used a borrowed anti-inflammatory medication which helped reduce both swelling and pain. Her anesthesiologist recommended seeking medical attention for the leg swelling. Currently, the swelling has decreased with improved mobility from previous limping.    MEDICAL HISTORY:  She has a history of knee pain previously treated with an injection by an occupational medicine physician, after which she has remained pain-free without further knee discomfort.      ROS:  General: -fever, -chills, -fatigue, -weight gain, -weight loss  Eyes: -vision changes, -redness, -discharge  ENT: -ear pain, -nasal congestion, -sore throat  Cardiovascular: -chest pain, -palpitations, -lower extremity edema  Respiratory: -cough, -shortness of breath  Gastrointestinal: -abdominal pain, -nausea, -vomiting, -diarrhea, -constipation, -blood in stool  Genitourinary: -dysuria, -hematuria, -frequency  Musculoskeletal: -joint pain, -muscle pain, +limb swelling, +limb pain, +difficulty walking  Skin: -rash, -lesion  Neurological: -headache, -dizziness, -numbness, -tingling  Psychiatric: -anxiety, -depression, -sleep difficulty         No visits with results within 6 Month(s) from this visit.   Latest known visit with results is:   Office Visit on 05/23/2024   Component Date Value Ref Range Status    WBC  05/29/2024 5.9  3.8 - 10.8 Thousand/uL Final    RBC 05/29/2024 4.20  3.80 - 5.10 Million/uL Final    Hemoglobin 05/29/2024 12.5  11.7 - 15.5 g/dL Final    Hematocrit 05/29/2024 38.3  35.0 - 45.0 % Final    MCV 05/29/2024 91.2  80.0 - 100.0 fL Final    MCH 05/29/2024 29.8  27.0 - 33.0 pg Final    MCHC 05/29/2024 32.6  32.0 - 36.0 g/dL Final    RDW 05/29/2024 12.7  11.0 - 15.0 % Final    Platelets 05/29/2024 215  140 - 400 Thousand/uL Final    MPV 05/29/2024 10.8  7.5 - 12.5 fL Final    Neutrophils, Abs 05/29/2024 3,027  1,500 - 7,800 cells/uL Final    Lymph # 05/29/2024 2,289  850 - 3,900 cells/uL Final    Mono # 05/29/2024 472  200 - 950 cells/uL Final    Eos # 05/29/2024 83  15 - 500 cells/uL Final    Baso # 05/29/2024 30  0 - 200 cells/uL Final    Neutrophils Relative 05/29/2024 51.3  % Final    Lymph % 05/29/2024 38.8  % Final    Mono % 05/29/2024 8.0  % Final    Eosinophil % 05/29/2024 1.4  % Final    Basophil % 05/29/2024 0.5  % Final    Glucose 05/29/2024 87  65 - 99 mg/dL Final    BUN 05/29/2024 16  7 - 25 mg/dL Final    Creatinine 05/29/2024 0.81  0.50 - 0.99 mg/dL Final    eGFR 05/29/2024 89  > OR = 60 mL/min/1.73m2 Final    BUN/Creatinine Ratio 05/29/2024 SEE NOTE:  6 - 22 (calc) Final    Sodium 05/29/2024 142  135 - 146 mmol/L Final    Potassium 05/29/2024 4.8  3.5 - 5.3 mmol/L Final    Chloride 05/29/2024 105  98 - 110 mmol/L Final    CO2 05/29/2024 31  20 - 32 mmol/L Final    Calcium 05/29/2024 9.3  8.6 - 10.2 mg/dL Final    Total Protein 05/29/2024 6.5  6.1 - 8.1 g/dL Final    Albumin 05/29/2024 4.0  3.6 - 5.1 g/dL Final    Globulin, Total 05/29/2024 2.5  1.9 - 3.7 g/dL (calc) Final    Albumin/Globulin Ratio 05/29/2024 1.6  1.0 - 2.5 (calc) Final    Total Bilirubin 05/29/2024 0.4  0.2 - 1.2 mg/dL Final    Alkaline Phosphatase 05/29/2024 66  31 - 125 U/L Final    AST 05/29/2024 17  10 - 35 U/L Final    ALT 05/29/2024 11  6 - 29 U/L Final    Cholesterol 05/29/2024 191  <200 mg/dL Final    HDL 05/29/2024  "61  > OR = 50 mg/dL Final    Triglycerides 05/29/2024 94  <150 mg/dL Final    LDL Cholesterol 05/29/2024 110 (H)  mg/dL (calc) Final    HDL/Cholesterol Ratio 05/29/2024 3.1  <5.0 (calc) Final    Non HDL Chol. (LDL+VLDL) 05/29/2024 130 (H)  <130 mg/dL (calc) Final    Hemoglobin A1C 05/29/2024 5.6  <5.7 % of total Hgb Final    FSH 05/29/2024 62.4  mIU/mL Final       History reviewed. No pertinent past medical history.  Social History[1]  Past Surgical History:   Procedure Laterality Date    COLONOSCOPY, SCREENING, LOW RISK PATIENT N/A 1/10/2025    Procedure: COLONOSCOPY, SCREENING, LOW RISK PATIENT;  Surgeon: Ayde Jerez MD;  Location: [facility];  Service: Endoscopy;  Laterality: N/A;  lm/ppm    HERNIA REPAIR      umbilical x2     Family History   Problem Relation Name Age of Onset    Colon cancer Mother  48    Cirrhosis Father      Liver cancer Father         The 10-year CVD risk score (Gurpreet et al., 2008) is: 3.6%    Values used to calculate the score:      Age: 49 years      Sex: Female      Diabetic: No      Tobacco smoker: No      Systolic Blood Pressure: 126 mmHg      Is BP treated: No      HDL Cholesterol: 61 mg/dL      Total Cholesterol: 191 mg/dL    Tests to Keep You Healthy    Mammogram: ORDERED BUT NOT SCHEDULED  Colon Cancer Screening: Met on 1/10/2025  Cervical Cancer Screening: ORDERED      Review of patient's allergies indicates:  No Known Allergies  Current Medications[2]    Review of Systems        Objective:      Vitals:    06/18/25 1604   BP: 126/88   Pulse: 81   SpO2: 97%   Weight: 107 kg (236 lb)   Height: 5' 4" (1.626 m)     Physical Exam  Physical Exam    Constitutional: In no acute distress. Normal appearance. Well-developed. Not ill-appearing.  HENT: Normocephalic. Atraumatic. External ears normal bilaterally. Nose normal. Normal lips. Oropharynx clear.  Eyes: No scleral icterus. Pupils are equal, round, and reactive to light.  Neck: No thyromegaly. No carotid " bruit.  Cardiovascular: Normal rate and regular rhythm. Radial pulses are 2+ bilaterally. Normal heart sounds. No murmur heard.  Pulmonary: Pulmonary effort is normal. Normal breath sounds.  Abdomen: Bowel sounds are normal. Abdomen is soft. No abdominal tenderness.  Musculoskeletal: Normal range of motion at all joints. Normal range of motion of the cervical back. No lumbar spasms. No lower extremity edema bilaterally.  Skin: Warm. Dry. Capillary refill takes less than 2 seconds.  Neurological: No focal deficit present. Alert and oriented to person, place, and time. No cranial nerve deficit. Sensation intact. No motor weakness. Gait is intact.  Psychiatric: Attention normal. Mood normal. Speech normal. Behavior is cooperative. No homicidal ideation. No suicidal ideation.  MSK: Knee - Right: No popliteal cyst palpated.           Assessment:       1. Right leg pain    2. Right leg swelling         Plan:       Right leg pain  -     US Lower Extremity Veins Right; Future; Expected date: 06/18/2025  -     US Lower Extremity Arteries Right; Future; Expected date: 06/18/2025    Right leg swelling  -     US Lower Extremity Veins Right; Future; Expected date: 06/18/2025  -     US Lower Extremity Arteries Right; Future; Expected date: 06/18/2025      Assessment & Plan    R22.41 Localized swelling, mass and lump, right lower limb  M79.671 Pain in right foot  Z87.39 Personal history of other diseases of the musculoskeletal system and connective tissue    LOCALIZED SWELLING AND MASS IN RIGHT LOWER LIMB:  - Considered blood clot as primary concern due to unilateral leg swelling in the right lower limb.  - Noris reports the swelling was significant but has decreased, and was warm yesterday.  - Pain has diminished after using anti-inflammatory treatment.  - Ruled out Baker's cyst through exam.  - Evaluated for other potential causes of one-sided edema, including arthritis exacerbation.  - Ordered stat ultrasound of lower  extremity today to evaluate for potential blood clot.  - Will review ultrasound results after office hours and contact the patient with findings.    PAIN IN RIGHT FOOT:  - Noris reports pain at the bottom of the heel of the right foot.    HISTORY OF MUSCULOSKELETAL DISEASE:  - Noris has a history of knee pain and received an injection in the knee from an occupational physician, which resolved the pain.         Follow up if symptoms worsen or fail to improve.        This note was generated with the assistance of ambient listening technology. Verbal consent was obtained by the patient and accompanying visitor(s) for the recording of patient appointment to facilitate this note. I attest to having reviewed and edited the generated note for accuracy, though some syntax or spelling errors may persist. Please contact the author of this note for any clarification.      6/25/2025 Esther Salgado         [1]   Social History  Socioeconomic History    Marital status:    Occupational History    Occupation: hoursekeeping   Tobacco Use    Smoking status: Never    Smokeless tobacco: Never   Substance and Sexual Activity    Alcohol use: Never    Drug use: No    Sexual activity: Yes     Partners: Male     Birth control/protection: Condom     Comment:    Social History Narrative    Live with    [2]   Current Outpatient Medications:     acetaminophen (TYLENOL) 325 MG tablet, Take 325 mg by mouth every 6 (six) hours as needed for Pain., Disp: , Rfl:     paroxetine (PAXIL) 20 MG tablet, Take 1 tablet (20 mg total) by mouth every morning., Disp: 30 tablet, Rfl: 11

## 2025-07-24 ENCOUNTER — TELEPHONE (OUTPATIENT)
Dept: FAMILY MEDICINE | Facility: CLINIC | Age: 50
End: 2025-07-24
Payer: COMMERCIAL